# Patient Record
Sex: FEMALE | Race: WHITE | Employment: OTHER | URBAN - METROPOLITAN AREA
[De-identification: names, ages, dates, MRNs, and addresses within clinical notes are randomized per-mention and may not be internally consistent; named-entity substitution may affect disease eponyms.]

---

## 2017-01-03 ENCOUNTER — LAB ENCOUNTER (OUTPATIENT)
Dept: LAB | Facility: HOSPITAL | Age: 82
End: 2017-01-03
Attending: OBSTETRICS & GYNECOLOGY

## 2017-01-03 ENCOUNTER — TELEPHONE (OUTPATIENT)
Dept: OBGYN CLINIC | Facility: CLINIC | Age: 82
End: 2017-01-03

## 2017-01-03 DIAGNOSIS — R35.0 FREQUENT URINATION: Primary | ICD-10-CM

## 2017-01-03 DIAGNOSIS — R35.0 FREQUENT URINATION: ICD-10-CM

## 2017-01-03 LAB
BILIRUB UR QL: NEGATIVE
COLOR UR: YELLOW
GLUCOSE UR-MCNC: NEGATIVE MG/DL
HGB UR QL STRIP.AUTO: NEGATIVE
KETONES UR-MCNC: NEGATIVE MG/DL
NITRITE UR QL STRIP.AUTO: POSITIVE
PH UR: 5 [PH] (ref 5–8)
PROT UR-MCNC: 30 MG/DL
RBC #/AREA URNS AUTO: 12 /HPF
SP GR UR STRIP: 1.02 (ref 1–1.03)
UROBILINOGEN UR STRIP-ACNC: <2
VIT C UR-MCNC: 40 MG/DL
WBC #/AREA URNS AUTO: 1059 /HPF

## 2017-01-03 PROCEDURE — 81001 URINALYSIS AUTO W/SCOPE: CPT

## 2017-01-03 PROCEDURE — 87186 SC STD MICRODIL/AGAR DIL: CPT

## 2017-01-03 PROCEDURE — 87088 URINE BACTERIA CULTURE: CPT

## 2017-01-03 PROCEDURE — 87086 URINE CULTURE/COLONY COUNT: CPT

## 2017-01-03 NOTE — TELEPHONE ENCOUNTER
Pt is 80years old and has dementia. Per son Rik Marin, pt seems to be getting worse again. Pt was treated for a UTI in November and was \"much better after taking the medication. \" Rik Marin states pt is wetting her pants and running to the bathroom thinking she ha

## 2017-01-04 ENCOUNTER — TELEPHONE (OUTPATIENT)
Dept: OBGYN CLINIC | Facility: CLINIC | Age: 82
End: 2017-01-04

## 2017-01-04 RX ORDER — SULFAMETHOXAZOLE AND TRIMETHOPRIM 800; 160 MG/1; MG/1
1 TABLET ORAL 2 TIMES DAILY
Qty: 6 TABLET | Refills: 0 | Status: SHIPPED | OUTPATIENT
Start: 2017-01-04 | End: 2017-01-07

## 2017-01-04 NOTE — TELEPHONE ENCOUNTER
----- Message from Margarito Arrington MD sent at 1/3/2017 10:22 PM CST -----  Abnormal u/a.   Bactrim DS bid x 3 days

## 2017-01-04 NOTE — TELEPHONE ENCOUNTER
Spoke to pt's son Gabriel Diaz, explained results and recs. Gabriel Diaz verbalized understanding and will call if pt's symptoms don't resolve after the treatment.

## 2017-02-03 ENCOUNTER — TELEPHONE (OUTPATIENT)
Dept: OBGYN CLINIC | Facility: CLINIC | Age: 82
End: 2017-02-03

## 2017-02-03 NOTE — TELEPHONE ENCOUNTER
Spoke to Liliana Barrientos who asked us to please call back in 10 minutes as he cannot talk right now.

## 2017-02-03 NOTE — TELEPHONE ENCOUNTER
Spoke to Christian, rescheduled pt for Wednesday, 2/8, as pt goes to  tuesdays. José Antonio Barroso is very upset, states we have been cancelling so many appts lately and he is considering switching practices.  Apologized to Christian, informed him that sometimes when appts

## 2017-02-03 NOTE — TELEPHONE ENCOUNTER
Son is furious yelling and very mad because Bruce Grossman is not in the office today, and demanding a appt for him mom TODAY, per son this the 3rd time the office cancels her appts

## 2017-02-08 ENCOUNTER — OFFICE VISIT (OUTPATIENT)
Dept: OBGYN CLINIC | Facility: CLINIC | Age: 82
End: 2017-02-08

## 2017-02-08 VITALS — DIASTOLIC BLOOD PRESSURE: 76 MMHG | HEART RATE: 67 BPM | SYSTOLIC BLOOD PRESSURE: 129 MMHG

## 2017-02-08 DIAGNOSIS — N81.11 MIDLINE CYSTOCELE: Primary | ICD-10-CM

## 2017-02-08 PROCEDURE — 99212 OFFICE O/P EST SF 10 MIN: CPT | Performed by: OBSTETRICS & GYNECOLOGY

## 2017-02-09 NOTE — PROGRESS NOTES
Pessary Check     Slava Tobias [id number]  here for pessary check. Last seen *10/25/16. Doing well with pessary. Here with son. Pt with worsening dementia. No longer wetting bed after taking antibiotics for UTI.     Pessary type:  Ring with suppor

## 2017-02-23 ENCOUNTER — TELEPHONE (OUTPATIENT)
Dept: OBGYN CLINIC | Facility: CLINIC | Age: 82
End: 2017-02-23

## 2017-02-23 DIAGNOSIS — N39.498 OTHER URINARY INCONTINENCE: Primary | ICD-10-CM

## 2017-02-23 NOTE — TELEPHONE ENCOUNTER
Ceasar Dunn states pt has UTI. Pt has wet her pants twice at  and sometimes in the night last night. Pt was c/o pain at day care, but has not complained to Ceasar Dunn. Pt has dementia so often complains of things in the am and then denies pain later when asked.

## 2017-02-24 ENCOUNTER — APPOINTMENT (OUTPATIENT)
Dept: LAB | Facility: HOSPITAL | Age: 82
End: 2017-02-24
Attending: OBSTETRICS & GYNECOLOGY
Payer: MEDICARE

## 2017-02-24 DIAGNOSIS — N39.498 OTHER URINARY INCONTINENCE: ICD-10-CM

## 2017-02-24 PROCEDURE — 87186 SC STD MICRODIL/AGAR DIL: CPT

## 2017-02-24 PROCEDURE — 87086 URINE CULTURE/COLONY COUNT: CPT

## 2017-02-24 PROCEDURE — 87088 URINE BACTERIA CULTURE: CPT

## 2017-02-24 RX ORDER — SULFAMETHOXAZOLE AND TRIMETHOPRIM 800; 160 MG/1; MG/1
TABLET ORAL
Qty: 6 TABLET | Refills: 0 | Status: SHIPPED | OUTPATIENT
Start: 2017-02-24 | End: 2017-05-11

## 2017-02-24 NOTE — TELEPHONE ENCOUNTER
Pt still needs urine culture. Once she does lab, she can have Bactrim DS bid x 3 days while we wait for results.   If she continues to have UTI, needs to be addressed with PCP

## 2017-02-24 NOTE — TELEPHONE ENCOUNTER
Informed Dilcia Manley that pt needs urine culture. Informed him once she does lab, she can have Bactrim DS bid x 3 days while we wait for results. Informed him if she continues to have UTI, needs to be addressed with PCP.   In

## 2017-02-25 ENCOUNTER — TELEPHONE (OUTPATIENT)
Dept: OBGYN CLINIC | Facility: CLINIC | Age: 82
End: 2017-02-25

## 2017-02-25 NOTE — TELEPHONE ENCOUNTER
LEFT MESSAGE AT THE PHONE NUMBER FOR [facility] THAT THE CULTURE IS STILL PRELIMINARY. WILL LIKELY HAVE RESULT Monday SO CAN CALL BACK THEN FOR THE RESULT.

## 2017-02-27 NOTE — TELEPHONE ENCOUNTER
Informed pt's son that Valeria Simon [id number] stated urine culture - ecoli sensitive to Bactrim and if he suspects another UTI, needs to see pcp. Rox Iyer the son states that he was informed to wait for the urine culture and then start the medication.   Started explain that I

## 2017-02-27 NOTE — TELEPHONE ENCOUNTER
Informed pt's son Levon Given that urine CX showed E coli, but JLK will need to review to see if pt should continue with Bactrim or if she will change medication. Per Scott Walters, ok to Lm on VM if he does not answer.  Please review urine cx

## 2017-03-24 ENCOUNTER — HOSPITAL ENCOUNTER (EMERGENCY)
Facility: HOSPITAL | Age: 82
Discharge: HOME | End: 2017-03-24
Attending: EMERGENCY MEDICINE
Payer: MEDICARE

## 2017-03-24 ENCOUNTER — APPOINTMENT (OUTPATIENT)
Dept: GENERAL RADIOLOGY | Facility: HOSPITAL | Age: 82
End: 2017-03-24
Attending: EMERGENCY MEDICINE
Payer: MEDICARE

## 2017-03-24 VITALS
OXYGEN SATURATION: 98 % | TEMPERATURE: 98 F | DIASTOLIC BLOOD PRESSURE: 68 MMHG | HEART RATE: 68 BPM | RESPIRATION RATE: 16 BRPM | SYSTOLIC BLOOD PRESSURE: 104 MMHG

## 2017-03-24 DIAGNOSIS — R11.2 NAUSEA AND VOMITING IN ADULT: Primary | ICD-10-CM

## 2017-03-24 DIAGNOSIS — E86.0 DEHYDRATION: ICD-10-CM

## 2017-03-24 LAB
ALBUMIN SERPL BCP-MCNC: 3.7 G/DL (ref 3.5–4.8)
ALBUMIN/GLOB SERPL: 1.2 {RATIO} (ref 1–2)
ALP SERPL-CCNC: 57 U/L (ref 32–100)
ALT SERPL-CCNC: 12 U/L (ref 14–54)
ANION GAP SERPL CALC-SCNC: 8 MMOL/L (ref 0–18)
AST SERPL-CCNC: 22 U/L (ref 15–41)
BACTERIA UR QL AUTO: NEGATIVE /HPF
BILIRUB SERPL-MCNC: 0.6 MG/DL (ref 0.3–1.2)
BILIRUB UR QL: NEGATIVE
BUN SERPL-MCNC: 25 MG/DL (ref 8–20)
BUN/CREAT SERPL: 17.9 (ref 10–20)
CALCIUM SERPL-MCNC: 9.1 MG/DL (ref 8.5–10.5)
CHLORIDE SERPL-SCNC: 102 MMOL/L (ref 95–110)
CLARITY UR: CLEAR
CO2 SERPL-SCNC: 26 MMOL/L (ref 22–32)
COLOR UR: YELLOW
CREAT SERPL-MCNC: 1.4 MG/DL (ref 0.5–1.5)
GLOBULIN PLAS-MCNC: 3 G/DL (ref 2.5–3.7)
GLUCOSE SERPL-MCNC: 107 MG/DL (ref 70–99)
GLUCOSE UR-MCNC: NEGATIVE MG/DL
HGB UR QL STRIP.AUTO: NEGATIVE
KETONES UR-MCNC: NEGATIVE MG/DL
LEUKOCYTE ESTERASE UR QL STRIP.AUTO: NEGATIVE
MAGNESIUM SERPL-MCNC: 2 MG/DL (ref 1.8–2.5)
NITRITE UR QL STRIP.AUTO: NEGATIVE
OSMOLALITY UR CALC.SUM OF ELEC: 287 MOSM/KG (ref 275–295)
PH UR: 6 [PH] (ref 5–8)
POTASSIUM SERPL-SCNC: 4.2 MMOL/L (ref 3.3–5.1)
PROT SERPL-MCNC: 6.7 G/DL (ref 5.9–8.4)
PROT UR-MCNC: NEGATIVE MG/DL
RBC #/AREA URNS AUTO: 1 /HPF
SODIUM SERPL-SCNC: 136 MMOL/L (ref 136–144)
SP GR UR STRIP: 1.02 (ref 1–1.03)
TROPONIN I SERPL-MCNC: 0.01 NG/ML (ref ?–0.03)
UROBILINOGEN UR STRIP-ACNC: <2
VIT C UR-MCNC: 40 MG/DL
WBC #/AREA URNS AUTO: <1 /HPF

## 2017-03-24 PROCEDURE — 85025 COMPLETE CBC W/AUTO DIFF WBC: CPT | Performed by: EMERGENCY MEDICINE

## 2017-03-24 PROCEDURE — 96360 HYDRATION IV INFUSION INIT: CPT

## 2017-03-24 PROCEDURE — 83735 ASSAY OF MAGNESIUM: CPT | Performed by: EMERGENCY MEDICINE

## 2017-03-24 PROCEDURE — 80053 COMPREHEN METABOLIC PANEL: CPT | Performed by: EMERGENCY MEDICINE

## 2017-03-24 PROCEDURE — 99285 EMERGENCY DEPT VISIT HI MDM: CPT

## 2017-03-24 PROCEDURE — 85060 BLOOD SMEAR INTERPRETATION: CPT | Performed by: EMERGENCY MEDICINE

## 2017-03-24 PROCEDURE — 93010 ELECTROCARDIOGRAM REPORT: CPT | Performed by: EMERGENCY MEDICINE

## 2017-03-24 PROCEDURE — 71010 XR CHEST AP PORTABLE  (CPT=71010): CPT

## 2017-03-24 PROCEDURE — 93005 ELECTROCARDIOGRAM TRACING: CPT

## 2017-03-24 PROCEDURE — 96361 HYDRATE IV INFUSION ADD-ON: CPT

## 2017-03-24 PROCEDURE — 84484 ASSAY OF TROPONIN QUANT: CPT | Performed by: EMERGENCY MEDICINE

## 2017-03-24 PROCEDURE — 81003 URINALYSIS AUTO W/O SCOPE: CPT | Performed by: EMERGENCY MEDICINE

## 2017-03-24 RX ORDER — ONDANSETRON 4 MG/1
4 TABLET, ORALLY DISINTEGRATING ORAL EVERY 8 HOURS PRN
Qty: 15 TABLET | Refills: 0 | Status: SHIPPED | OUTPATIENT
Start: 2017-03-24 | End: 2017-03-29

## 2017-03-24 RX ORDER — SODIUM CHLORIDE 9 MG/ML
INJECTION, SOLUTION INTRAVENOUS ONCE
Status: COMPLETED | OUTPATIENT
Start: 2017-03-24 | End: 2017-03-24

## 2017-03-24 NOTE — ED NOTES
Patient arrived and transported by [facility] after patient had just left the adult  facility and son whom is at the bedside noticed mom having difficulty in ambulating and increased weakness.  Upon arrival patient noted alert and oriented x1-2 nam

## 2017-03-24 NOTE — ED INITIAL ASSESSMENT (HPI)
Pt c/o generalized weakness, pt's skin feels warm to touch, pt c/o not having enough energy today while walking. Pt vomiting upon arrival, dark colored secretions. Pt denies any pain or sob.

## 2017-03-24 NOTE — ED PROVIDER NOTES
Patient Seen in: [facility] Emergency Department    History   No chief complaint on file.     Stated Complaint: weakness, vomiting     HPI    79 yo F with PMH HTN, HL, dementia presenting for evaluation of several hours of generalized weakness and na mouth.  Take one tablet by mouth daily   lisinopril (PRINIVIL,ZESTRIL) 10 MG Oral Tab,         Family History   Problem Relation Age of Onset   • Hypertension Mother    • Cancer Son      basal cell carcinoma   • Glaucoma Brother    • Diabetes Neg    • Macul GFR, -American 43 (*)     All other components within normal limits   URINALYSIS WITH CULTURE REFLEX - Abnormal; Notable for the following:     Ascorbic Acid Urine 40  (*)     All other components within normal limits   CBC W/ DIFFERENTIAL - Abn Dictated by (CST): Dinora Ratliff MD on 3/24/2017 at 17:53     Approved by (CST): Dinora Ratliff MD on 3/24/2017 at 17:53          3/24/2017  CONCLUSION: No acute cardiopulmonary abnormality.        MDM     DIFFERENTIAL DIAGNOSIS: After history and physical exa

## 2017-03-25 LAB
BASOPHILS # BLD: 0 K/UL (ref 0–0.2)
BASOPHILS NFR BLD: 0 %
EOSINOPHIL # BLD: 0 K/UL (ref 0–0.7)
EOSINOPHIL NFR BLD: 1 %
ERYTHROCYTE [DISTWIDTH] IN BLOOD BY AUTOMATED COUNT: 13.6 % (ref 11–15)
HCT VFR BLD AUTO: 29.1 % (ref 35–48)
HGB BLD-MCNC: 9.7 G/DL (ref 12–16)
LYMPHOCYTES # BLD: 0.5 K/UL (ref 1–4)
LYMPHOCYTES NFR BLD: 30 %
MCH RBC QN AUTO: 29.3 PG (ref 27–32)
MCHC RBC AUTO-ENTMCNC: 33.5 G/DL (ref 32–37)
MCV RBC AUTO: 87.4 FL (ref 80–100)
MONOCYTES # BLD: 0.3 K/UL (ref 0–1)
MONOCYTES NFR BLD: 18 %
NEUTROPHILS # BLD AUTO: 0.8 K/UL (ref 1.8–7.7)
NEUTROPHILS NFR BLD: 50 %
PLATELET # BLD AUTO: 115 K/UL (ref 140–400)
PMV BLD AUTO: 8 FL (ref 7.4–10.3)
RBC # BLD AUTO: 3.33 M/UL (ref 3.7–5.4)
WBC # BLD AUTO: 1.6 K/UL (ref 4–11)

## 2017-03-25 NOTE — ED NOTES
Nabila Quintana from lab called and stated abnormal labs which include wbc 1.6, platlets 116, hgb 9.7 per Nabila Quintana from lab will send blood for pathology. MD Julien Garrett aware.

## 2017-03-25 NOTE — ED NOTES
Patient tolerated PO challenge without any signs of vomiting or feeling nauseous. Both sons who are at the bedside asking to get mom dressed informed them to allow for the iv fluids to finish.

## 2017-04-27 PROBLEM — D61.818 PANCYTOPENIA (HCC): Status: ACTIVE | Noted: 2017-04-27

## 2017-04-27 PROBLEM — R63.4 WEIGHT LOSS: Status: ACTIVE | Noted: 2017-04-27

## 2017-04-27 PROBLEM — R13.14 PHARYNGOESOPHAGEAL DYSPHAGIA: Status: ACTIVE | Noted: 2017-04-27

## 2017-04-27 PROBLEM — I10 ESSENTIAL HYPERTENSION: Status: ACTIVE | Noted: 2017-04-27

## 2017-04-27 PROBLEM — F03.90 DEMENTIA WITHOUT BEHAVIORAL DISTURBANCE, UNSPECIFIED DEMENTIA TYPE (HCC): Status: ACTIVE | Noted: 2017-04-27

## 2017-05-04 ENCOUNTER — LAB ENCOUNTER (OUTPATIENT)
Dept: LAB | Facility: HOSPITAL | Age: 82
End: 2017-05-04
Attending: INTERNAL MEDICINE
Payer: MEDICARE

## 2017-05-04 DIAGNOSIS — D61.818 PANCYTOPENIA (HCC): ICD-10-CM

## 2017-05-04 DIAGNOSIS — I10 ESSENTIAL HYPERTENSION: ICD-10-CM

## 2017-05-04 DIAGNOSIS — R63.4 WEIGHT LOSS: ICD-10-CM

## 2017-05-04 PROCEDURE — 80069 RENAL FUNCTION PANEL: CPT

## 2017-05-04 PROCEDURE — 36415 COLL VENOUS BLD VENIPUNCTURE: CPT

## 2017-05-04 PROCEDURE — 85025 COMPLETE CBC W/AUTO DIFF WBC: CPT

## 2017-05-04 PROCEDURE — 80076 HEPATIC FUNCTION PANEL: CPT

## 2017-05-04 PROCEDURE — 84443 ASSAY THYROID STIM HORMONE: CPT

## 2017-05-11 ENCOUNTER — OFFICE VISIT (OUTPATIENT)
Dept: OBGYN CLINIC | Facility: CLINIC | Age: 82
End: 2017-05-11

## 2017-05-11 VITALS — SYSTOLIC BLOOD PRESSURE: 119 MMHG | HEART RATE: 78 BPM | DIASTOLIC BLOOD PRESSURE: 69 MMHG

## 2017-05-11 DIAGNOSIS — N81.11 MIDLINE CYSTOCELE: Primary | ICD-10-CM

## 2017-05-11 PROCEDURE — 99212 OFFICE O/P EST SF 10 MIN: CPT | Performed by: OBSTETRICS & GYNECOLOGY

## 2017-05-11 NOTE — PROGRESS NOTES
Pessary Check     Robert Madrigal [id number]  here for pessary check. Last seen 2/8/17. Doing well with pessary. Has good support. Here with son. Pt with worsening dementia. Had UTI in 2/2017.   No new c/o    Pessary type:  Ring with support pess

## 2017-05-22 PROCEDURE — 87086 URINE CULTURE/COLONY COUNT: CPT | Performed by: NURSE PRACTITIONER

## 2017-05-22 PROCEDURE — 87077 CULTURE AEROBIC IDENTIFY: CPT | Performed by: NURSE PRACTITIONER

## 2017-05-22 PROCEDURE — 87186 SC STD MICRODIL/AGAR DIL: CPT | Performed by: NURSE PRACTITIONER

## 2017-06-12 PROCEDURE — 87086 URINE CULTURE/COLONY COUNT: CPT | Performed by: INTERNAL MEDICINE

## 2017-07-12 ENCOUNTER — APPOINTMENT (OUTPATIENT)
Dept: LAB | Facility: HOSPITAL | Age: 82
End: 2017-07-12
Attending: NURSE PRACTITIONER
Payer: MEDICARE

## 2017-07-12 DIAGNOSIS — R30.0 DYSURIA: ICD-10-CM

## 2017-07-12 PROCEDURE — 87186 SC STD MICRODIL/AGAR DIL: CPT

## 2017-07-12 PROCEDURE — 87086 URINE CULTURE/COLONY COUNT: CPT

## 2017-07-12 PROCEDURE — 87088 URINE BACTERIA CULTURE: CPT

## 2017-07-21 ENCOUNTER — APPOINTMENT (OUTPATIENT)
Dept: LAB | Facility: HOSPITAL | Age: 82
End: 2017-07-21
Attending: NURSE PRACTITIONER
Payer: MEDICARE

## 2017-07-21 DIAGNOSIS — N30.00 ACUTE CYSTITIS WITHOUT HEMATURIA: ICD-10-CM

## 2017-07-21 PROCEDURE — 87086 URINE CULTURE/COLONY COUNT: CPT

## 2017-08-14 ENCOUNTER — APPOINTMENT (OUTPATIENT)
Dept: LAB | Facility: HOSPITAL | Age: 82
End: 2017-08-14
Attending: INTERNAL MEDICINE
Payer: MEDICARE

## 2017-08-14 PROCEDURE — 87086 URINE CULTURE/COLONY COUNT: CPT | Performed by: INTERNAL MEDICINE

## 2017-08-14 PROCEDURE — 87186 SC STD MICRODIL/AGAR DIL: CPT | Performed by: INTERNAL MEDICINE

## 2017-08-14 PROCEDURE — 81001 URINALYSIS AUTO W/SCOPE: CPT | Performed by: INTERNAL MEDICINE

## 2017-08-14 PROCEDURE — 87088 URINE BACTERIA CULTURE: CPT | Performed by: INTERNAL MEDICINE

## 2017-09-14 ENCOUNTER — LAB ENCOUNTER (OUTPATIENT)
Dept: LAB | Facility: HOSPITAL | Age: 82
End: 2017-09-14
Attending: NURSE PRACTITIONER
Payer: MEDICARE

## 2017-09-14 DIAGNOSIS — N30.00 ACUTE CYSTITIS WITHOUT HEMATURIA: ICD-10-CM

## 2017-09-14 LAB
ALBUMIN SERPL BCP-MCNC: 3.8 G/DL (ref 3.5–4.8)
ALBUMIN/GLOB SERPL: 1.1 {RATIO} (ref 1–2)
ALP SERPL-CCNC: 61 U/L (ref 32–100)
ALT SERPL-CCNC: 12 U/L (ref 14–54)
ANION GAP SERPL CALC-SCNC: 9 MMOL/L (ref 0–18)
AST SERPL-CCNC: 17 U/L (ref 15–41)
BASOPHILS # BLD: 0 K/UL (ref 0–0.2)
BASOPHILS NFR BLD: 0 %
BILIRUB SERPL-MCNC: 0.3 MG/DL (ref 0.3–1.2)
BUN SERPL-MCNC: 17 MG/DL (ref 8–20)
BUN/CREAT SERPL: 15.5 (ref 10–20)
CALCIUM SERPL-MCNC: 9.5 MG/DL (ref 8.5–10.5)
CHLORIDE SERPL-SCNC: 102 MMOL/L (ref 95–110)
CO2 SERPL-SCNC: 30 MMOL/L (ref 22–32)
CREAT SERPL-MCNC: 1.1 MG/DL (ref 0.5–1.5)
EOSINOPHIL # BLD: 0 K/UL (ref 0–0.7)
EOSINOPHIL NFR BLD: 1 %
ERYTHROCYTE [DISTWIDTH] IN BLOOD BY AUTOMATED COUNT: 15 % (ref 11–15)
GLOBULIN PLAS-MCNC: 3.4 G/DL (ref 2.5–3.7)
GLUCOSE SERPL-MCNC: 95 MG/DL (ref 70–99)
HCT VFR BLD AUTO: 31.7 % (ref 35–48)
HGB BLD-MCNC: 10.4 G/DL (ref 12–16)
LYMPHOCYTES # BLD: 0.9 K/UL (ref 1–4)
LYMPHOCYTES NFR BLD: 39 %
MCH RBC QN AUTO: 28 PG (ref 27–32)
MCHC RBC AUTO-ENTMCNC: 32.9 G/DL (ref 32–37)
MCV RBC AUTO: 85.2 FL (ref 80–100)
MONOCYTES # BLD: 0.3 K/UL (ref 0–1)
MONOCYTES NFR BLD: 11 %
NEUTROPHILS # BLD AUTO: 1.2 K/UL (ref 1.8–7.7)
NEUTROPHILS NFR BLD: 49 %
OSMOLALITY UR CALC.SUM OF ELEC: 293 MOSM/KG (ref 275–295)
PLATELET # BLD AUTO: 143 K/UL (ref 140–400)
PMV BLD AUTO: 8.4 FL (ref 7.4–10.3)
POTASSIUM SERPL-SCNC: 4.8 MMOL/L (ref 3.3–5.1)
PROT SERPL-MCNC: 7.2 G/DL (ref 5.9–8.4)
RBC # BLD AUTO: 3.72 M/UL (ref 3.7–5.4)
SODIUM SERPL-SCNC: 141 MMOL/L (ref 136–144)
WBC # BLD AUTO: 2.4 K/UL (ref 4–11)

## 2017-09-14 PROCEDURE — 85025 COMPLETE CBC W/AUTO DIFF WBC: CPT

## 2017-09-14 PROCEDURE — 36415 COLL VENOUS BLD VENIPUNCTURE: CPT

## 2017-09-14 PROCEDURE — 80053 COMPREHEN METABOLIC PANEL: CPT

## 2017-09-21 PROCEDURE — 87086 URINE CULTURE/COLONY COUNT: CPT | Performed by: INTERNAL MEDICINE

## 2017-11-06 ENCOUNTER — HOSPITAL ENCOUNTER (EMERGENCY)
Facility: HOSPITAL | Age: 82
Discharge: HOME | End: 2017-11-06
Attending: EMERGENCY MEDICINE
Payer: MEDICARE

## 2017-11-06 VITALS
TEMPERATURE: 98 F | HEART RATE: 65 BPM | OXYGEN SATURATION: 99 % | SYSTOLIC BLOOD PRESSURE: 137 MMHG | RESPIRATION RATE: 15 BRPM | DIASTOLIC BLOOD PRESSURE: 55 MMHG

## 2017-11-06 DIAGNOSIS — D72.819 LEUKOPENIA, UNSPECIFIED TYPE: ICD-10-CM

## 2017-11-06 DIAGNOSIS — N39.0 URINARY TRACT INFECTION WITHOUT HEMATURIA, SITE UNSPECIFIED: Primary | ICD-10-CM

## 2017-11-06 DIAGNOSIS — R40.0 SOMNOLENCE: ICD-10-CM

## 2017-11-06 PROCEDURE — 87086 URINE CULTURE/COLONY COUNT: CPT | Performed by: EMERGENCY MEDICINE

## 2017-11-06 PROCEDURE — 85025 COMPLETE CBC W/AUTO DIFF WBC: CPT | Performed by: EMERGENCY MEDICINE

## 2017-11-06 PROCEDURE — 81001 URINALYSIS AUTO W/SCOPE: CPT | Performed by: EMERGENCY MEDICINE

## 2017-11-06 PROCEDURE — 80048 BASIC METABOLIC PNL TOTAL CA: CPT | Performed by: EMERGENCY MEDICINE

## 2017-11-06 PROCEDURE — 87186 SC STD MICRODIL/AGAR DIL: CPT | Performed by: EMERGENCY MEDICINE

## 2017-11-06 PROCEDURE — 96374 THER/PROPH/DIAG INJ IV PUSH: CPT

## 2017-11-06 PROCEDURE — 96361 HYDRATE IV INFUSION ADD-ON: CPT

## 2017-11-06 PROCEDURE — 87077 CULTURE AEROBIC IDENTIFY: CPT | Performed by: EMERGENCY MEDICINE

## 2017-11-06 PROCEDURE — 99284 EMERGENCY DEPT VISIT MOD MDM: CPT

## 2017-11-06 RX ORDER — CEFADROXIL 500 MG/1
500 CAPSULE ORAL 2 TIMES DAILY
Qty: 14 CAPSULE | Refills: 0 | Status: SHIPPED | OUTPATIENT
Start: 2017-11-06 | End: 2017-11-13

## 2017-11-06 NOTE — ED PROVIDER NOTES
Patient Seen in: [facility] AND [facility] Emergency Department    History   Patient presents with:  Altered Mental Status (neurologic)    Stated Complaint:     HPI    Patient presents to the emergency department with complaint of apparently the family noted liz (10 mg total) by mouth daily. Calcium 600 MG Oral Tab,  Take 600 mg by mouth. aspirin (ASPIRIN EC LOW DOSE) 81 MG Oral Tab EC,  Take  by mouth.  Take one tablet by mouth daily       Family History   Problem Relation Age of Onset   • Hypertension Mother perfusion. Respiratory:  Lungs clear to auscultation bilaterally with good effort. No wheezes, ronchi, or rales. Abdomen:  Soft, non-tender, non-distended. No masses, no hepato-splenomegaly. Negative McBurney's point tenderness.   Musculoskeletal:  Goo Bacteria Urine Many (*)     All other components within normal limits   CBC W/ DIFFERENTIAL - Abnormal; Notable for the following:     WBC 2.5 (*)     RBC 3.58 (*)     HGB 10.0 (*)     HCT 30.4 (*)     RDW 15.3 (*)     Neutrophil Absolute 1.0 (*)     All o

## 2017-11-06 NOTE — ED INITIAL ASSESSMENT (HPI)
C/o \"catatonic state\" since approximately 9p. Last night according to family, bcame entirely nonverbal at 11am today. Pt was sitting at the end of the bed upon EMS arrival to home and was ambulatory to the stretcher, following commands. .  Hx marlo

## 2018-01-01 ENCOUNTER — LAB REQUISITION (OUTPATIENT)
Dept: LAB | Facility: HOSPITAL | Age: 83
End: 2018-01-01
Payer: MEDICARE

## 2018-01-01 ENCOUNTER — APPOINTMENT (OUTPATIENT)
Dept: GENERAL RADIOLOGY | Facility: HOSPITAL | Age: 83
DRG: 570 | End: 2018-01-01
Attending: INTERNAL MEDICINE
Payer: MEDICARE

## 2018-01-01 ENCOUNTER — APPOINTMENT (OUTPATIENT)
Dept: GENERAL RADIOLOGY | Facility: HOSPITAL | Age: 83
DRG: 871 | End: 2018-01-01
Attending: EMERGENCY MEDICINE
Payer: MEDICARE

## 2018-01-01 ENCOUNTER — ANESTHESIA EVENT (OUTPATIENT)
Dept: SURGERY | Facility: HOSPITAL | Age: 83
DRG: 570 | End: 2018-01-01
Payer: MEDICARE

## 2018-01-01 ENCOUNTER — HOSPITAL ENCOUNTER (INPATIENT)
Facility: HOSPITAL | Age: 83
LOS: 5 days | Discharge: NURSING, REHAB OR LONG-TERM CARE | DRG: 871 | End: 2019-01-01
Attending: EMERGENCY MEDICINE | Admitting: INTERNAL MEDICINE
Payer: MEDICARE

## 2018-01-01 ENCOUNTER — ANESTHESIA (OUTPATIENT)
Dept: SURGERY | Facility: HOSPITAL | Age: 83
DRG: 570 | End: 2018-01-01
Payer: MEDICARE

## 2018-01-01 ENCOUNTER — HOSPITAL ENCOUNTER (INPATIENT)
Facility: HOSPITAL | Age: 83
LOS: 7 days | Discharge: NURSING, REHAB OR LONG-TERM CARE | DRG: 570 | End: 2018-01-01
Attending: EMERGENCY MEDICINE | Admitting: INTERNAL MEDICINE
Payer: MEDICARE

## 2018-01-01 ENCOUNTER — APPOINTMENT (OUTPATIENT)
Dept: PICC SERVICES | Facility: HOSPITAL | Age: 83
DRG: 570 | End: 2018-01-01
Attending: INTERNAL MEDICINE
Payer: MEDICARE

## 2018-01-01 ENCOUNTER — TELEPHONE (OUTPATIENT)
Dept: INTERNAL MEDICINE CLINIC | Facility: CLINIC | Age: 83
End: 2018-01-01

## 2018-01-01 VITALS
HEIGHT: 62 IN | TEMPERATURE: 98 F | OXYGEN SATURATION: 95 % | HEART RATE: 85 BPM | BODY MASS INDEX: 26.28 KG/M2 | DIASTOLIC BLOOD PRESSURE: 69 MMHG | RESPIRATION RATE: 18 BRPM | SYSTOLIC BLOOD PRESSURE: 142 MMHG | WEIGHT: 142.81 LBS

## 2018-01-01 DIAGNOSIS — L89.159 SACRAL DECUBITUS ULCER: ICD-10-CM

## 2018-01-01 DIAGNOSIS — I10 ESSENTIAL (PRIMARY) HYPERTENSION: ICD-10-CM

## 2018-01-01 DIAGNOSIS — R13.10 DYSPHAGIA: ICD-10-CM

## 2018-01-01 DIAGNOSIS — F03.91 DEMENTIA WITH BEHAVIORAL DISTURBANCE (HCC): ICD-10-CM

## 2018-01-01 DIAGNOSIS — E86.0 DEHYDRATION: ICD-10-CM

## 2018-01-01 DIAGNOSIS — L89.219 PRESSURE INJURY OF SKIN OF RIGHT HIP, UNSPECIFIED INJURY STAGE: Primary | ICD-10-CM

## 2018-01-01 DIAGNOSIS — Z74.1 NEED FOR ASSISTANCE WITH PERSONAL CARE: ICD-10-CM

## 2018-01-01 DIAGNOSIS — D64.9 ANEMIA, UNSPECIFIED TYPE: ICD-10-CM

## 2018-01-01 DIAGNOSIS — R30.0 DYSURIA: ICD-10-CM

## 2018-01-01 DIAGNOSIS — R50.9 FEVER, UNSPECIFIED FEVER CAUSE: ICD-10-CM

## 2018-01-01 DIAGNOSIS — J96.00 ACUTE RESPIRATORY FAILURE (HCC): ICD-10-CM

## 2018-01-01 DIAGNOSIS — M19.90 OSTEOARTHRITIS: ICD-10-CM

## 2018-01-01 DIAGNOSIS — A41.9 SEPSIS, DUE TO UNSPECIFIED ORGANISM: Primary | ICD-10-CM

## 2018-01-01 DIAGNOSIS — F03.90 DEMENTIA WITHOUT BEHAVIORAL DISTURBANCE (HCC): ICD-10-CM

## 2018-01-01 LAB
ADENOVIRUS PCR:: NEGATIVE
ALBUMIN SERPL BCP-MCNC: 2.2 G/DL (ref 3.5–4.8)
ALBUMIN SERPL BCP-MCNC: 2.3 G/DL (ref 3.5–4.8)
ALBUMIN SERPL BCP-MCNC: 2.7 G/DL (ref 3.5–4.8)
ALBUMIN/GLOB SERPL: 0.7 {RATIO} (ref 1–2)
ALBUMIN/GLOB SERPL: 0.9 {RATIO} (ref 1–2)
ALP SERPL-CCNC: 53 U/L (ref 32–100)
ALP SERPL-CCNC: 65 U/L (ref 32–100)
ALP SERPL-CCNC: 87 U/L (ref 32–100)
ALT SERPL-CCNC: 12 U/L (ref 14–54)
ALT SERPL-CCNC: 15 U/L (ref 14–54)
ALT SERPL-CCNC: 39 U/L (ref 14–54)
AMMONIA PLAS-MCNC: 20 UG/DL (ref 19.5–64.6)
AMMONIA PLAS-MCNC: 46 UG/DL (ref 19.5–64.6)
ANION GAP SERPL CALC-SCNC: 10 MMOL/L (ref 0–18)
ANION GAP SERPL CALC-SCNC: 4 MMOL/L (ref 0–18)
ANION GAP SERPL CALC-SCNC: 6 MMOL/L (ref 0–18)
ANION GAP SERPL CALC-SCNC: 7 MMOL/L (ref 0–18)
ANION GAP SERPL CALC-SCNC: 9 MMOL/L (ref 0–18)
ANTIBODY SCREEN: NEGATIVE
APTT PPP: 30.8 SECONDS (ref 23.2–35.3)
APTT PPP: 35.1 SECONDS (ref 23.2–35.3)
AST SERPL-CCNC: 20 U/L (ref 15–41)
AST SERPL-CCNC: 21 U/L (ref 15–41)
AST SERPL-CCNC: 34 U/L (ref 15–41)
B PERT DNA SPEC QL NAA+PROBE: NEGATIVE
BASOPHILS # BLD: 0 K/UL (ref 0–0.2)
BASOPHILS # BLD: 0.1 K/UL (ref 0–0.2)
BASOPHILS NFR BLD: 0 %
BASOPHILS NFR BLD: 1 %
BILIRUB DIRECT SERPL-MCNC: 0.2 MG/DL (ref 0–0.2)
BILIRUB SERPL-MCNC: 0.3 MG/DL (ref 0.3–1.2)
BILIRUB SERPL-MCNC: 0.4 MG/DL (ref 0.3–1.2)
BILIRUB SERPL-MCNC: 0.4 MG/DL (ref 0.3–1.2)
BILIRUB UR QL: NEGATIVE
BILIRUB UR QL: NEGATIVE
BLOOD TYPE BARCODE: 9500
BUN SERPL-MCNC: 13 MG/DL (ref 8–20)
BUN SERPL-MCNC: 13 MG/DL (ref 8–20)
BUN SERPL-MCNC: 17 MG/DL (ref 8–20)
BUN SERPL-MCNC: 19 MG/DL (ref 8–20)
BUN SERPL-MCNC: 21 MG/DL (ref 8–20)
BUN SERPL-MCNC: 23 MG/DL (ref 8–20)
BUN SERPL-MCNC: 25 MG/DL (ref 8–20)
BUN/CREAT SERPL: 13.5 (ref 10–20)
BUN/CREAT SERPL: 13.5 (ref 10–20)
BUN/CREAT SERPL: 15.1 (ref 10–20)
BUN/CREAT SERPL: 17.3 (ref 10–20)
BUN/CREAT SERPL: 20.6 (ref 10–20)
BUN/CREAT SERPL: 24 (ref 10–20)
BUN/CREAT SERPL: 27.5 (ref 10–20)
C PNEUM DNA SPEC QL NAA+PROBE: NEGATIVE
CALCIUM SERPL-MCNC: 8 MG/DL (ref 8.5–10.5)
CALCIUM SERPL-MCNC: 8.2 MG/DL (ref 8.5–10.5)
CALCIUM SERPL-MCNC: 8.3 MG/DL (ref 8.5–10.5)
CALCIUM SERPL-MCNC: 8.4 MG/DL (ref 8.5–10.5)
CALCIUM SERPL-MCNC: 8.6 MG/DL (ref 8.5–10.5)
CALCIUM SERPL-MCNC: 8.7 MG/DL (ref 8.5–10.5)
CALCIUM SERPL-MCNC: 8.8 MG/DL (ref 8.5–10.5)
CHLORIDE SERPL-SCNC: 101 MMOL/L (ref 95–110)
CHLORIDE SERPL-SCNC: 102 MMOL/L (ref 95–110)
CHLORIDE SERPL-SCNC: 103 MMOL/L (ref 95–110)
CHLORIDE SERPL-SCNC: 103 MMOL/L (ref 95–110)
CHLORIDE SERPL-SCNC: 104 MMOL/L (ref 95–110)
CHLORIDE SERPL-SCNC: 98 MMOL/L (ref 95–110)
CHLORIDE SERPL-SCNC: 99 MMOL/L (ref 95–110)
CLARITY UR: CLEAR
CO2 SERPL-SCNC: 24 MMOL/L (ref 22–32)
CO2 SERPL-SCNC: 25 MMOL/L (ref 22–32)
CO2 SERPL-SCNC: 25 MMOL/L (ref 22–32)
CO2 SERPL-SCNC: 28 MMOL/L (ref 22–32)
CO2 SERPL-SCNC: 30 MMOL/L (ref 22–32)
COLOR UR: YELLOW
COLOR UR: YELLOW
CORONAVIRUS 229E PCR:: NEGATIVE
CORONAVIRUS HKU1 PCR:: NEGATIVE
CORONAVIRUS NL63 PCR:: NEGATIVE
CORONAVIRUS OC43 PCR:: NEGATIVE
CREAT SERPL-MCNC: 0.91 MG/DL (ref 0.5–1.5)
CREAT SERPL-MCNC: 0.96 MG/DL (ref 0.5–1.5)
CREAT SERPL-MCNC: 0.98 MG/DL (ref 0.5–1.5)
CREAT SERPL-MCNC: 1.02 MG/DL (ref 0.5–1.5)
CREAT SERPL-MCNC: 1.26 MG/DL (ref 0.5–1.5)
EOSINOPHIL # BLD: 0 K/UL (ref 0–0.7)
EOSINOPHIL # BLD: 0.1 K/UL (ref 0–0.7)
EOSINOPHIL NFR BLD: 0 %
EOSINOPHIL NFR BLD: 1 %
EOSINOPHIL NFR BLD: 2 %
ERYTHROCYTE [DISTWIDTH] IN BLOOD BY AUTOMATED COUNT: 16.8 % (ref 11–15)
ERYTHROCYTE [DISTWIDTH] IN BLOOD BY AUTOMATED COUNT: 18 % (ref 11–15)
ERYTHROCYTE [DISTWIDTH] IN BLOOD BY AUTOMATED COUNT: 18.8 % (ref 11–15)
ERYTHROCYTE [DISTWIDTH] IN BLOOD BY AUTOMATED COUNT: 19 % (ref 11–15)
ERYTHROCYTE [DISTWIDTH] IN BLOOD BY AUTOMATED COUNT: 19 % (ref 11–15)
ERYTHROCYTE [DISTWIDTH] IN BLOOD BY AUTOMATED COUNT: 19.6 % (ref 11–15)
ERYTHROCYTE [DISTWIDTH] IN BLOOD BY AUTOMATED COUNT: 19.6 % (ref 11–15)
ERYTHROCYTE [DISTWIDTH] IN BLOOD BY AUTOMATED COUNT: 20 % (ref 11–15)
FERRITIN SERPL IA-MCNC: 280 NG/ML (ref 11–307)
FERRITIN SERPL IA-MCNC: 588 NG/ML (ref 11–307)
FLUAV RNA SPEC QL NAA+PROBE: NEGATIVE
FLUBV RNA SPEC QL NAA+PROBE: NEGATIVE
GLOBULIN PLAS-MCNC: 3 G/DL (ref 2.5–3.7)
GLOBULIN PLAS-MCNC: 3.2 G/DL (ref 2.5–3.7)
GLUCOSE SERPL-MCNC: 103 MG/DL (ref 70–99)
GLUCOSE SERPL-MCNC: 105 MG/DL (ref 70–99)
GLUCOSE SERPL-MCNC: 109 MG/DL (ref 70–99)
GLUCOSE SERPL-MCNC: 84 MG/DL (ref 70–99)
GLUCOSE SERPL-MCNC: 90 MG/DL (ref 70–99)
GLUCOSE SERPL-MCNC: 92 MG/DL (ref 70–99)
GLUCOSE SERPL-MCNC: 98 MG/DL (ref 70–99)
GLUCOSE UR-MCNC: NEGATIVE MG/DL
GLUCOSE UR-MCNC: NEGATIVE MG/DL
HCT VFR BLD AUTO: 22.3 % (ref 35–48)
HCT VFR BLD AUTO: 23 % (ref 35–48)
HCT VFR BLD AUTO: 23.2 % (ref 35–48)
HCT VFR BLD AUTO: 23.9 % (ref 35–48)
HCT VFR BLD AUTO: 24.8 % (ref 35–48)
HCT VFR BLD AUTO: 26.4 % (ref 35–48)
HCT VFR BLD AUTO: 27 % (ref 35–48)
HCT VFR BLD AUTO: 27.7 % (ref 35–48)
HEMOCCULT STL QL: POSITIVE
HGB BLD-MCNC: 7.1 G/DL (ref 12–16)
HGB BLD-MCNC: 7.5 G/DL (ref 12–16)
HGB BLD-MCNC: 7.6 G/DL (ref 12–16)
HGB BLD-MCNC: 7.8 G/DL (ref 12–16)
HGB BLD-MCNC: 8 G/DL (ref 12–16)
HGB BLD-MCNC: 8.6 G/DL (ref 12–16)
HGB BLD-MCNC: 8.8 G/DL (ref 12–16)
HGB BLD-MCNC: 9 G/DL (ref 12–16)
HGB BLD-MCNC: 9.8 G/DL (ref 12–16)
HGB UR QL STRIP.AUTO: NEGATIVE
INR BLD: 1.1 (ref 0.9–1.2)
INR BLD: 1.2 (ref 0.9–1.2)
IRON SATN MFR SERPL: 5 % (ref 15–50)
IRON SERPL-MCNC: 46 MCG/DL (ref 28–170)
IRON SERPL-MCNC: 8 MCG/DL (ref 28–170)
KETONES UR-MCNC: NEGATIVE MG/DL
KETONES UR-MCNC: NEGATIVE MG/DL
LACTATE SERPL-SCNC: 1.3 MMOL/L (ref 0.5–2.2)
LEUKOCYTE ESTERASE UR QL STRIP.AUTO: NEGATIVE
LIPASE SERPL-CCNC: 24 U/L (ref 22–51)
LYMPHOCYTES # BLD: 0.6 K/UL (ref 1–4)
LYMPHOCYTES # BLD: 1.1 K/UL (ref 1–4)
LYMPHOCYTES # BLD: 1.3 K/UL (ref 1–4)
LYMPHOCYTES # BLD: 1.6 K/UL (ref 1–4)
LYMPHOCYTES # BLD: 1.6 K/UL (ref 1–4)
LYMPHOCYTES # BLD: 1.7 K/UL (ref 1–4)
LYMPHOCYTES # BLD: 2 K/UL (ref 1–4)
LYMPHOCYTES # BLD: 2.4 K/UL (ref 1–4)
LYMPHOCYTES NFR BLD: 41 %
LYMPHOCYTES NFR BLD: 42 %
LYMPHOCYTES NFR BLD: 44 %
LYMPHOCYTES NFR BLD: 44 %
LYMPHOCYTES NFR BLD: 45 %
LYMPHOCYTES NFR BLD: 47 %
LYMPHOCYTES NFR BLD: 49 %
LYMPHOCYTES NFR BLD: 5 %
MAGNESIUM SERPL-MCNC: 2.1 MG/DL (ref 1.8–2.5)
MCH RBC QN AUTO: 26.4 PG (ref 27–32)
MCH RBC QN AUTO: 26.7 PG (ref 27–32)
MCH RBC QN AUTO: 26.8 PG (ref 27–32)
MCH RBC QN AUTO: 27.1 PG (ref 27–32)
MCH RBC QN AUTO: 27.2 PG (ref 27–32)
MCH RBC QN AUTO: 28 PG (ref 27–32)
MCHC RBC AUTO-ENTMCNC: 31.9 G/DL (ref 32–37)
MCHC RBC AUTO-ENTMCNC: 32.3 G/DL (ref 32–37)
MCHC RBC AUTO-ENTMCNC: 32.3 G/DL (ref 32–37)
MCHC RBC AUTO-ENTMCNC: 32.4 G/DL (ref 32–37)
MCHC RBC AUTO-ENTMCNC: 32.4 G/DL (ref 32–37)
MCHC RBC AUTO-ENTMCNC: 32.5 G/DL (ref 32–37)
MCHC RBC AUTO-ENTMCNC: 32.7 G/DL (ref 32–37)
MCHC RBC AUTO-ENTMCNC: 32.8 G/DL (ref 32–37)
MCV RBC AUTO: 82.4 FL (ref 80–100)
MCV RBC AUTO: 82.5 FL (ref 80–100)
MCV RBC AUTO: 82.5 FL (ref 80–100)
MCV RBC AUTO: 82.6 FL (ref 80–100)
MCV RBC AUTO: 82.9 FL (ref 80–100)
MCV RBC AUTO: 83.8 FL (ref 80–100)
MCV RBC AUTO: 83.9 FL (ref 80–100)
MCV RBC AUTO: 86.7 FL (ref 80–100)
METAMYELOCYTES # BLD MANUAL: 0.03 K/UL
METAMYELOCYTES NFR BLD: 1 %
METAPNEUMOVIRUS PCR:: NEGATIVE
MONOCYTES # BLD: 0.1 K/UL (ref 0–1)
MONOCYTES # BLD: 0.2 K/UL (ref 0–1)
MONOCYTES # BLD: 0.4 K/UL (ref 0–1)
MONOCYTES # BLD: 0.4 K/UL (ref 0–1)
MONOCYTES # BLD: 0.5 K/UL (ref 0–1)
MONOCYTES # BLD: 0.7 K/UL (ref 0–1)
MONOCYTES # BLD: 0.7 K/UL (ref 0–1)
MONOCYTES # BLD: 0.9 K/UL (ref 0–1)
MONOCYTES NFR BLD: 11 %
MONOCYTES NFR BLD: 13 %
MONOCYTES NFR BLD: 14 %
MONOCYTES NFR BLD: 15 %
MONOCYTES NFR BLD: 18 %
MONOCYTES NFR BLD: 2 %
MONOCYTES NFR BLD: 7 %
MONOCYTES NFR BLD: 7 %
MRSA DNA SPEC QL NAA+PROBE: NEGATIVE
MYCOPLASMA PNEUMONIA PCR:: NEGATIVE
NEUTROPHILS # BLD AUTO: 1.1 K/UL (ref 1.8–7.7)
NEUTROPHILS # BLD AUTO: 1.3 K/UL (ref 1.8–7.7)
NEUTROPHILS # BLD AUTO: 1.4 K/UL (ref 1.8–7.7)
NEUTROPHILS # BLD AUTO: 1.6 K/UL (ref 1.8–7.7)
NEUTROPHILS # BLD AUTO: 1.6 K/UL (ref 1.8–7.7)
NEUTROPHILS # BLD AUTO: 10.6 K/UL (ref 1.8–7.7)
NEUTROPHILS # BLD AUTO: 2.2 K/UL (ref 1.8–7.7)
NEUTROPHILS # BLD AUTO: 2.3 K/UL (ref 1.8–7.7)
NEUTROPHILS NFR BLD: 36 %
NEUTROPHILS NFR BLD: 38 %
NEUTROPHILS NFR BLD: 43 %
NEUTROPHILS NFR BLD: 44 %
NEUTROPHILS NFR BLD: 44 %
NEUTROPHILS NFR BLD: 47 %
NEUTROPHILS NFR BLD: 47 %
NEUTROPHILS NFR BLD: 88 %
NEUTS BAND NFR BLD: 2 %
NITRITE UR QL STRIP.AUTO: NEGATIVE
NITRITE UR QL STRIP.AUTO: NEGATIVE
OSMOLALITY UR CALC.SUM OF ELEC: 280 MOSM/KG (ref 275–295)
OSMOLALITY UR CALC.SUM OF ELEC: 282 MOSM/KG (ref 275–295)
OSMOLALITY UR CALC.SUM OF ELEC: 283 MOSM/KG (ref 275–295)
OSMOLALITY UR CALC.SUM OF ELEC: 288 MOSM/KG (ref 275–295)
OSMOLALITY UR CALC.SUM OF ELEC: 288 MOSM/KG (ref 275–295)
PARAINFLUENZA 1 PCR:: NEGATIVE
PARAINFLUENZA 2 PCR:: NEGATIVE
PARAINFLUENZA 3 PCR:: NEGATIVE
PARAINFLUENZA 4 PCR:: NEGATIVE
PH UR: 7 [PH] (ref 5–8)
PH UR: 8 [PH] (ref 5–8)
PHOSPHATE SERPL-MCNC: 3.5 MG/DL (ref 2.4–4.7)
PLATELET # BLD AUTO: 157 K/UL (ref 140–400)
PLATELET # BLD AUTO: 191 K/UL (ref 140–400)
PLATELET # BLD AUTO: 202 K/UL (ref 140–400)
PLATELET # BLD AUTO: 208 K/UL (ref 140–400)
PLATELET # BLD AUTO: 216 K/UL (ref 140–400)
PLATELET # BLD AUTO: 232 K/UL (ref 140–400)
PLATELET # BLD AUTO: 233 K/UL (ref 140–400)
PLATELET # BLD AUTO: 236 K/UL (ref 140–400)
PMV BLD AUTO: 7.1 FL (ref 7.4–10.3)
PMV BLD AUTO: 7.3 FL (ref 7.4–10.3)
PMV BLD AUTO: 7.3 FL (ref 7.4–10.3)
PMV BLD AUTO: 7.4 FL (ref 7.4–10.3)
PMV BLD AUTO: 7.5 FL (ref 7.4–10.3)
PMV BLD AUTO: 7.7 FL (ref 7.4–10.3)
PMV BLD AUTO: 8.1 FL (ref 7.4–10.3)
PMV BLD AUTO: 8.3 FL (ref 7.4–10.3)
POTASSIUM SERPL-SCNC: 4 MMOL/L (ref 3.3–5.1)
POTASSIUM SERPL-SCNC: 4.4 MMOL/L (ref 3.3–5.1)
POTASSIUM SERPL-SCNC: 4.4 MMOL/L (ref 3.3–5.1)
POTASSIUM SERPL-SCNC: 4.5 MMOL/L (ref 3.3–5.1)
POTASSIUM SERPL-SCNC: 4.5 MMOL/L (ref 3.3–5.1)
POTASSIUM SERPL-SCNC: 4.6 MMOL/L (ref 3.3–5.1)
POTASSIUM SERPL-SCNC: 4.8 MMOL/L (ref 3.3–5.1)
PROCALCITONIN SERPL-MCNC: 8.28 NG/ML (ref ?–0.11)
PROT SERPL-MCNC: 5.5 G/DL (ref 5.9–8.4)
PROT SERPL-MCNC: 5.7 G/DL (ref 5.9–8.4)
PROT SERPL-MCNC: 5.8 G/DL (ref 5.9–8.4)
PROT UR-MCNC: 100 MG/DL
PROT UR-MCNC: >=500 MG/DL
PROTHROMBIN TIME: 14.3 SECONDS (ref 11.8–14.5)
PROTHROMBIN TIME: 15 SECONDS (ref 11.8–14.5)
RBC # BLD AUTO: 2.7 M/UL (ref 3.7–5.4)
RBC # BLD AUTO: 2.79 M/UL (ref 3.7–5.4)
RBC # BLD AUTO: 2.81 M/UL (ref 3.7–5.4)
RBC # BLD AUTO: 2.86 M/UL (ref 3.7–5.4)
RBC # BLD AUTO: 2.88 M/UL (ref 3.7–5.4)
RBC # BLD AUTO: 3.19 M/UL (ref 3.7–5.4)
RBC # BLD AUTO: 3.22 M/UL (ref 3.7–5.4)
RBC # BLD AUTO: 3.3 M/UL (ref 3.7–5.4)
RBC #/AREA URNS AUTO: 23 /HPF
RBC #/AREA URNS AUTO: <1 /HPF
RH BLOOD TYPE: NEGATIVE
RHINOVIRUS/ENTERO PCR:: NEGATIVE
RSV RNA SPEC QL NAA+PROBE: NEGATIVE
SODIUM SERPL-SCNC: 134 MMOL/L (ref 136–144)
SODIUM SERPL-SCNC: 134 MMOL/L (ref 136–144)
SODIUM SERPL-SCNC: 135 MMOL/L (ref 136–144)
SODIUM SERPL-SCNC: 135 MMOL/L (ref 136–144)
SODIUM SERPL-SCNC: 136 MMOL/L (ref 136–144)
SODIUM SERPL-SCNC: 137 MMOL/L (ref 136–144)
SODIUM SERPL-SCNC: 137 MMOL/L (ref 136–144)
SP GR UR STRIP: 1.01 (ref 1–1.03)
SP GR UR STRIP: 1.01 (ref 1–1.03)
T3 SERPL-MCNC: 0.76 NG/ML (ref 0.87–1.78)
T4 FREE SERPL-MCNC: 0.87 NG/DL (ref 0.58–1.64)
TIBC SERPL-MCNC: 164 MCG/DL (ref 228–428)
TRANSFERRIN SERPL-MCNC: 124 MG/DL (ref 192–382)
TSH SERPL-ACNC: 0.28 UIU/ML (ref 0.45–5.33)
TSH SERPL-ACNC: 1.46 UIU/ML (ref 0.45–5.33)
UROBILINOGEN UR STRIP-ACNC: <2
UROBILINOGEN UR STRIP-ACNC: <2
VIT B12 SERPL-MCNC: 623 PG/ML (ref 181–914)
VIT B12 SERPL-MCNC: 755 PG/ML (ref 181–914)
VIT C UR-MCNC: NEGATIVE MG/DL
WBC # BLD AUTO: 12.1 K/UL (ref 4–11)
WBC # BLD AUTO: 2.6 K/UL (ref 4–11)
WBC # BLD AUTO: 2.9 K/UL (ref 4–11)
WBC # BLD AUTO: 3.6 K/UL (ref 4–11)
WBC # BLD AUTO: 3.7 K/UL (ref 4–11)
WBC # BLD AUTO: 3.8 K/UL (ref 4–11)
WBC # BLD AUTO: 4.9 K/UL (ref 4–11)
WBC # BLD AUTO: 4.9 K/UL (ref 4–11)
WBC #/AREA URNS AUTO: 2 /HPF
WBC #/AREA URNS AUTO: 485 /HPF

## 2018-01-01 PROCEDURE — 82140 ASSAY OF AMMONIA: CPT | Performed by: OTHER

## 2018-01-01 PROCEDURE — 73502 X-RAY EXAM HIP UNI 2-3 VIEWS: CPT | Performed by: INTERNAL MEDICINE

## 2018-01-01 PROCEDURE — 80048 BASIC METABOLIC PNL TOTAL CA: CPT | Performed by: INTERNAL MEDICINE

## 2018-01-01 PROCEDURE — 99233 SBSQ HOSP IP/OBS HIGH 50: CPT | Performed by: INTERNAL MEDICINE

## 2018-01-01 PROCEDURE — 81001 URINALYSIS AUTO W/SCOPE: CPT

## 2018-01-01 PROCEDURE — 85025 COMPLETE CBC W/AUTO DIFF WBC: CPT | Performed by: INTERNAL MEDICINE

## 2018-01-01 PROCEDURE — 02HV33Z INSERTION OF INFUSION DEVICE INTO SUPERIOR VENA CAVA, PERCUTANEOUS APPROACH: ICD-10-PCS | Performed by: INTERNAL MEDICINE

## 2018-01-01 PROCEDURE — 87077 CULTURE AEROBIC IDENTIFY: CPT

## 2018-01-01 PROCEDURE — 80053 COMPREHEN METABOLIC PANEL: CPT | Performed by: INTERNAL MEDICINE

## 2018-01-01 PROCEDURE — 99223 1ST HOSP IP/OBS HIGH 75: CPT | Performed by: INTERNAL MEDICINE

## 2018-01-01 PROCEDURE — 87086 URINE CULTURE/COLONY COUNT: CPT

## 2018-01-01 PROCEDURE — 82140 ASSAY OF AMMONIA: CPT | Performed by: INTERNAL MEDICINE

## 2018-01-01 PROCEDURE — 30233N1 TRANSFUSION OF NONAUTOLOGOUS RED BLOOD CELLS INTO PERIPHERAL VEIN, PERCUTANEOUS APPROACH: ICD-10-PCS | Performed by: INTERNAL MEDICINE

## 2018-01-01 PROCEDURE — 87186 SC STD MICRODIL/AGAR DIL: CPT

## 2018-01-01 PROCEDURE — 0JBL0ZZ EXCISION OF RIGHT UPPER LEG SUBCUTANEOUS TISSUE AND FASCIA, OPEN APPROACH: ICD-10-PCS | Performed by: SURGERY

## 2018-01-01 PROCEDURE — 99232 SBSQ HOSP IP/OBS MODERATE 35: CPT | Performed by: INTERNAL MEDICINE

## 2018-01-01 PROCEDURE — 71045 X-RAY EXAM CHEST 1 VIEW: CPT | Performed by: EMERGENCY MEDICINE

## 2018-01-01 RX ORDER — LISINOPRIL 10 MG/1
10 TABLET ORAL DAILY
Status: DISCONTINUED | OUTPATIENT
Start: 2018-01-01 | End: 2018-01-01

## 2018-01-01 RX ORDER — ONDANSETRON 4 MG/1
4 TABLET, ORALLY DISINTEGRATING ORAL EVERY 6 HOURS PRN
Status: ON HOLD | COMMUNITY
End: 2019-01-01

## 2018-01-01 RX ORDER — SODIUM CHLORIDE, SODIUM LACTATE, POTASSIUM CHLORIDE, CALCIUM CHLORIDE 600; 310; 30; 20 MG/100ML; MG/100ML; MG/100ML; MG/100ML
INJECTION, SOLUTION INTRAVENOUS CONTINUOUS
Status: DISCONTINUED | OUTPATIENT
Start: 2018-01-01 | End: 2018-01-01 | Stop reason: HOSPADM

## 2018-01-01 RX ORDER — HEPARIN SODIUM 5000 [USP'U]/ML
5000 INJECTION, SOLUTION INTRAVENOUS; SUBCUTANEOUS EVERY 8 HOURS SCHEDULED
Status: DISCONTINUED | OUTPATIENT
Start: 2018-01-01 | End: 2018-01-01

## 2018-01-01 RX ORDER — SODIUM CHLORIDE 0.9 % (FLUSH) 0.9 %
10 SYRINGE (ML) INJECTION AS NEEDED
Status: DISCONTINUED | OUTPATIENT
Start: 2018-01-01 | End: 2018-01-01

## 2018-01-01 RX ORDER — DIVALPROEX SODIUM 125 MG/1
125 CAPSULE, COATED PELLETS ORAL NIGHTLY
Qty: 30 CAPSULE | Refills: 0 | Status: ON HOLD | OUTPATIENT
Start: 2018-01-01 | End: 2019-01-01

## 2018-01-01 RX ORDER — HYDROCODONE BITARTRATE AND ACETAMINOPHEN 5; 325 MG/1; MG/1
1 TABLET ORAL EVERY 6 HOURS PRN
Status: DISCONTINUED | OUTPATIENT
Start: 2018-01-01 | End: 2018-01-01

## 2018-01-01 RX ORDER — CEFAZOLIN SODIUM 1 G/3ML
INJECTION, POWDER, FOR SOLUTION INTRAMUSCULAR; INTRAVENOUS AS NEEDED
Status: DISCONTINUED | OUTPATIENT
Start: 2018-01-01 | End: 2018-01-01 | Stop reason: SURG

## 2018-01-01 RX ORDER — HEPARIN SODIUM 5000 [USP'U]/ML
5000 INJECTION, SOLUTION INTRAVENOUS; SUBCUTANEOUS EVERY 8 HOURS SCHEDULED
Qty: 1 VIAL | Refills: 0 | Status: ON HOLD | OUTPATIENT
Start: 2018-01-01 | End: 2019-01-01

## 2018-01-01 RX ORDER — LIDOCAINE HYDROCHLORIDE 10 MG/ML
INJECTION, SOLUTION EPIDURAL; INFILTRATION; INTRACAUDAL; PERINEURAL AS NEEDED
Status: DISCONTINUED | OUTPATIENT
Start: 2018-01-01 | End: 2018-01-01 | Stop reason: SURG

## 2018-01-01 RX ORDER — SODIUM CHLORIDE 9 MG/ML
INJECTION, SOLUTION INTRAVENOUS CONTINUOUS
Status: DISCONTINUED | OUTPATIENT
Start: 2018-01-01 | End: 2018-01-01

## 2018-01-01 RX ORDER — NALOXONE HYDROCHLORIDE 0.4 MG/ML
80 INJECTION, SOLUTION INTRAMUSCULAR; INTRAVENOUS; SUBCUTANEOUS AS NEEDED
Status: DISCONTINUED | OUTPATIENT
Start: 2018-01-01 | End: 2018-01-01 | Stop reason: HOSPADM

## 2018-01-01 RX ORDER — DEXTROSE AND SODIUM CHLORIDE 5; .9 G/100ML; G/100ML
INJECTION, SOLUTION INTRAVENOUS CONTINUOUS
Status: DISCONTINUED | OUTPATIENT
Start: 2018-01-01 | End: 2019-01-01

## 2018-01-01 RX ORDER — CALCIUM CARBONATE 500(1250)
500 TABLET ORAL DAILY
Status: DISCONTINUED | OUTPATIENT
Start: 2018-01-01 | End: 2018-01-01

## 2018-01-01 RX ORDER — MELATONIN
325
Status: DISCONTINUED | OUTPATIENT
Start: 2018-01-01 | End: 2018-01-01

## 2018-01-01 RX ORDER — ACETAMINOPHEN 325 MG/1
325 TABLET ORAL EVERY 6 HOURS PRN
Status: DISCONTINUED | OUTPATIENT
Start: 2018-01-01 | End: 2018-01-01

## 2018-01-01 RX ORDER — LIDOCAINE HYDROCHLORIDE 10 MG/ML
0.5 INJECTION, SOLUTION INFILTRATION; PERINEURAL ONCE AS NEEDED
Status: ACTIVE | OUTPATIENT
Start: 2018-01-01 | End: 2018-01-01

## 2018-01-01 RX ORDER — 0.9 % SODIUM CHLORIDE 0.9 %
VIAL (ML) INJECTION
Status: COMPLETED
Start: 2018-01-01 | End: 2018-01-01

## 2018-01-01 RX ORDER — ONDANSETRON 2 MG/ML
4 INJECTION INTRAMUSCULAR; INTRAVENOUS ONCE AS NEEDED
Status: DISCONTINUED | OUTPATIENT
Start: 2018-01-01 | End: 2018-01-01 | Stop reason: HOSPADM

## 2018-01-01 RX ORDER — HYDROCODONE BITARTRATE AND ACETAMINOPHEN 5; 325 MG/1; MG/1
2 TABLET ORAL AS NEEDED
Status: DISCONTINUED | OUTPATIENT
Start: 2018-01-01 | End: 2018-01-01 | Stop reason: HOSPADM

## 2018-01-01 RX ORDER — SODIUM CHLORIDE 9 MG/ML
INJECTION, SOLUTION INTRAVENOUS ONCE
Status: DISCONTINUED | OUTPATIENT
Start: 2018-01-01 | End: 2018-01-01

## 2018-01-01 RX ORDER — ACETAMINOPHEN 650 MG/1
650 SUPPOSITORY RECTAL EVERY 6 HOURS PRN
Status: DISCONTINUED | OUTPATIENT
Start: 2018-01-01 | End: 2019-01-01

## 2018-01-01 RX ORDER — MORPHINE SULFATE 4 MG/ML
2 INJECTION, SOLUTION INTRAMUSCULAR; INTRAVENOUS EVERY 10 MIN PRN
Status: DISCONTINUED | OUTPATIENT
Start: 2018-01-01 | End: 2018-01-01 | Stop reason: HOSPADM

## 2018-01-01 RX ORDER — SODIUM CHLORIDE 9 MG/ML
INJECTION, SOLUTION INTRAVENOUS
Status: COMPLETED
Start: 2018-01-01 | End: 2018-01-01

## 2018-01-01 RX ORDER — MORPHINE SULFATE 10 MG/ML
6 INJECTION, SOLUTION INTRAMUSCULAR; INTRAVENOUS EVERY 10 MIN PRN
Status: DISCONTINUED | OUTPATIENT
Start: 2018-01-01 | End: 2018-01-01 | Stop reason: HOSPADM

## 2018-01-01 RX ORDER — ONDANSETRON 4 MG/1
4 TABLET, ORALLY DISINTEGRATING ORAL EVERY 6 HOURS PRN
Status: DISCONTINUED | OUTPATIENT
Start: 2018-01-01 | End: 2018-01-01

## 2018-01-01 RX ORDER — HYDROCODONE BITARTRATE AND ACETAMINOPHEN 5; 325 MG/1; MG/1
1 TABLET ORAL AS NEEDED
Status: DISCONTINUED | OUTPATIENT
Start: 2018-01-01 | End: 2018-01-01 | Stop reason: HOSPADM

## 2018-01-01 RX ORDER — DIVALPROEX SODIUM 125 MG/1
125 CAPSULE, COATED PELLETS ORAL NIGHTLY
Status: DISCONTINUED | OUTPATIENT
Start: 2018-01-01 | End: 2018-01-01

## 2018-01-01 RX ORDER — HYDROCODONE BITARTRATE AND ACETAMINOPHEN 5; 325 MG/1; MG/1
1 TABLET ORAL EVERY 6 HOURS PRN
Qty: 30 TABLET | Refills: 0 | Status: ON HOLD | OUTPATIENT
Start: 2018-01-01 | End: 2019-01-01

## 2018-01-01 RX ORDER — MORPHINE SULFATE 4 MG/ML
4 INJECTION, SOLUTION INTRAMUSCULAR; INTRAVENOUS EVERY 10 MIN PRN
Status: DISCONTINUED | OUTPATIENT
Start: 2018-01-01 | End: 2018-01-01 | Stop reason: HOSPADM

## 2018-01-01 RX ORDER — LIDOCAINE HYDROCHLORIDE 40 MG/ML
SOLUTION TOPICAL AS NEEDED
Status: DISCONTINUED | OUTPATIENT
Start: 2018-01-01 | End: 2018-01-01 | Stop reason: SURG

## 2018-01-01 RX ORDER — SODIUM CHLORIDE 9 MG/ML
INJECTION, SOLUTION INTRAVENOUS CONTINUOUS PRN
Status: DISCONTINUED | OUTPATIENT
Start: 2018-01-01 | End: 2018-01-01 | Stop reason: SURG

## 2018-01-01 RX ADMIN — LIDOCAINE HYDROCHLORIDE 5 MG: 10 INJECTION, SOLUTION EPIDURAL; INFILTRATION; INTRACAUDAL; PERINEURAL at 10:34:00

## 2018-01-01 RX ADMIN — SODIUM CHLORIDE: 9 INJECTION, SOLUTION INTRAVENOUS at 11:03:00

## 2018-01-01 RX ADMIN — SODIUM CHLORIDE: 9 INJECTION, SOLUTION INTRAVENOUS at 10:25:00

## 2018-01-01 RX ADMIN — CEFAZOLIN SODIUM 2 G: 1 INJECTION, POWDER, FOR SOLUTION INTRAMUSCULAR; INTRAVENOUS at 10:45:00

## 2018-01-01 RX ADMIN — LIDOCAINE HYDROCHLORIDE 2 ML: 40 SOLUTION TOPICAL at 10:34:00

## 2018-02-20 ENCOUNTER — APPOINTMENT (OUTPATIENT)
Dept: CT IMAGING | Facility: HOSPITAL | Age: 83
DRG: 690 | End: 2018-02-20
Attending: EMERGENCY MEDICINE
Payer: MEDICARE

## 2018-02-20 ENCOUNTER — HOSPITAL ENCOUNTER (EMERGENCY)
Facility: HOSPITAL | Age: 83
Discharge: HOME | DRG: 690 | End: 2018-02-20
Attending: EMERGENCY MEDICINE
Payer: MEDICARE

## 2018-02-20 ENCOUNTER — APPOINTMENT (OUTPATIENT)
Dept: GENERAL RADIOLOGY | Facility: HOSPITAL | Age: 83
DRG: 690 | End: 2018-02-20
Attending: EMERGENCY MEDICINE
Payer: MEDICARE

## 2018-02-20 ENCOUNTER — HOSPITAL ENCOUNTER (INPATIENT)
Facility: HOSPITAL | Age: 83
LOS: 4 days | Discharge: HOME HEALTH | DRG: 690 | End: 2018-02-24
Attending: EMERGENCY MEDICINE | Admitting: INTERNAL MEDICINE
Payer: MEDICARE

## 2018-02-20 VITALS
DIASTOLIC BLOOD PRESSURE: 58 MMHG | HEART RATE: 73 BPM | BODY MASS INDEX: 27.6 KG/M2 | SYSTOLIC BLOOD PRESSURE: 147 MMHG | WEIGHT: 150 LBS | OXYGEN SATURATION: 97 % | RESPIRATION RATE: 17 BRPM | HEIGHT: 62 IN | TEMPERATURE: 98 F

## 2018-02-20 DIAGNOSIS — R53.1 WEAKNESS GENERALIZED: ICD-10-CM

## 2018-02-20 DIAGNOSIS — N39.0 URINARY TRACT INFECTION WITHOUT HEMATURIA, SITE UNSPECIFIED: Primary | ICD-10-CM

## 2018-02-20 LAB
ANION GAP SERPL CALC-SCNC: 8 MMOL/L (ref 0–18)
BASOPHILS # BLD: 0 K/UL (ref 0–0.2)
BASOPHILS NFR BLD: 1 %
BILIRUB UR QL: NEGATIVE
BUN SERPL-MCNC: 27 MG/DL (ref 8–20)
BUN/CREAT SERPL: 22.3 (ref 10–20)
CALCIUM SERPL-MCNC: 8.9 MG/DL (ref 8.5–10.5)
CHLORIDE SERPL-SCNC: 100 MMOL/L (ref 95–110)
CO2 SERPL-SCNC: 28 MMOL/L (ref 22–32)
COLOR UR: YELLOW
CREAT SERPL-MCNC: 1.21 MG/DL (ref 0.5–1.5)
EOSINOPHIL # BLD: 0.1 K/UL (ref 0–0.7)
EOSINOPHIL NFR BLD: 2 %
ERYTHROCYTE [DISTWIDTH] IN BLOOD BY AUTOMATED COUNT: 14.6 % (ref 11–15)
GLUCOSE BLDC GLUCOMTR-MCNC: 99 MG/DL (ref 70–99)
GLUCOSE SERPL-MCNC: 87 MG/DL (ref 70–99)
GLUCOSE UR-MCNC: NEGATIVE MG/DL
HCT VFR BLD AUTO: 29.6 % (ref 35–48)
HGB BLD-MCNC: 9.9 G/DL (ref 12–16)
KETONES UR-MCNC: NEGATIVE MG/DL
LYMPHOCYTES # BLD: 1.3 K/UL (ref 1–4)
LYMPHOCYTES NFR BLD: 39 %
MCH RBC QN AUTO: 28.7 PG (ref 27–32)
MCHC RBC AUTO-ENTMCNC: 33.3 G/DL (ref 32–37)
MCV RBC AUTO: 86.3 FL (ref 80–100)
MONOCYTES # BLD: 0.3 K/UL (ref 0–1)
MONOCYTES NFR BLD: 11 %
NEUTROPHILS # BLD AUTO: 1.6 K/UL (ref 1.8–7.7)
NEUTROPHILS NFR BLD: 49 %
NITRITE UR QL STRIP.AUTO: NEGATIVE
OSMOLALITY UR CALC.SUM OF ELEC: 286 MOSM/KG (ref 275–295)
PH UR: 7 [PH] (ref 5–8)
PLATELET # BLD AUTO: 114 K/UL (ref 140–400)
PMV BLD AUTO: 8.4 FL (ref 7.4–10.3)
POTASSIUM SERPL-SCNC: 4.7 MMOL/L (ref 3.3–5.1)
PROT UR-MCNC: 100 MG/DL
RBC # BLD AUTO: 3.44 M/UL (ref 3.7–5.4)
RBC #/AREA URNS AUTO: 8 /HPF
SODIUM SERPL-SCNC: 136 MMOL/L (ref 136–144)
SP GR UR STRIP: 1.01 (ref 1–1.03)
TROPONIN I SERPL-MCNC: 0.01 NG/ML (ref ?–0.03)
UROBILINOGEN UR STRIP-ACNC: <2
VIT C UR-MCNC: NEGATIVE MG/DL
WBC # BLD AUTO: 3.3 K/UL (ref 4–11)
WBC #/AREA URNS AUTO: 2122 /HPF

## 2018-02-20 PROCEDURE — 85025 COMPLETE CBC W/AUTO DIFF WBC: CPT | Performed by: EMERGENCY MEDICINE

## 2018-02-20 PROCEDURE — 96360 HYDRATION IV INFUSION INIT: CPT

## 2018-02-20 PROCEDURE — 93010 ELECTROCARDIOGRAM REPORT: CPT | Performed by: EMERGENCY MEDICINE

## 2018-02-20 PROCEDURE — 81001 URINALYSIS AUTO W/SCOPE: CPT | Performed by: EMERGENCY MEDICINE

## 2018-02-20 PROCEDURE — 93005 ELECTROCARDIOGRAM TRACING: CPT

## 2018-02-20 PROCEDURE — A4216 STERILE WATER/SALINE, 10 ML: HCPCS | Performed by: INTERNAL MEDICINE

## 2018-02-20 PROCEDURE — 99285 EMERGENCY DEPT VISIT HI MDM: CPT

## 2018-02-20 PROCEDURE — 87086 URINE CULTURE/COLONY COUNT: CPT | Performed by: EMERGENCY MEDICINE

## 2018-02-20 PROCEDURE — 80048 BASIC METABOLIC PNL TOTAL CA: CPT | Performed by: EMERGENCY MEDICINE

## 2018-02-20 PROCEDURE — 71045 X-RAY EXAM CHEST 1 VIEW: CPT | Performed by: EMERGENCY MEDICINE

## 2018-02-20 PROCEDURE — 84484 ASSAY OF TROPONIN QUANT: CPT | Performed by: EMERGENCY MEDICINE

## 2018-02-20 PROCEDURE — 96365 THER/PROPH/DIAG IV INF INIT: CPT

## 2018-02-20 PROCEDURE — 70450 CT HEAD/BRAIN W/O DYE: CPT | Performed by: EMERGENCY MEDICINE

## 2018-02-20 PROCEDURE — 82962 GLUCOSE BLOOD TEST: CPT

## 2018-02-20 RX ORDER — ACETAMINOPHEN 325 MG/1
650 TABLET ORAL EVERY 6 HOURS PRN
Status: DISCONTINUED | OUTPATIENT
Start: 2018-02-20 | End: 2018-02-21

## 2018-02-20 RX ORDER — HEPARIN SODIUM 5000 [USP'U]/ML
5000 INJECTION, SOLUTION INTRAVENOUS; SUBCUTANEOUS EVERY 12 HOURS SCHEDULED
Status: DISCONTINUED | OUTPATIENT
Start: 2018-02-20 | End: 2018-02-24

## 2018-02-20 RX ORDER — SODIUM CHLORIDE 9 MG/ML
INJECTION, SOLUTION INTRAVENOUS CONTINUOUS
Status: DISCONTINUED | OUTPATIENT
Start: 2018-02-20 | End: 2018-02-24

## 2018-02-20 RX ORDER — ASPIRIN 81 MG/1
81 TABLET ORAL ONCE
Status: COMPLETED | OUTPATIENT
Start: 2018-02-20 | End: 2018-02-20

## 2018-02-20 RX ORDER — LISINOPRIL 10 MG/1
10 TABLET ORAL DAILY
Status: DISCONTINUED | OUTPATIENT
Start: 2018-02-21 | End: 2018-02-24

## 2018-02-20 RX ORDER — NITROFURANTOIN 25; 75 MG/1; MG/1
100 CAPSULE ORAL 2 TIMES DAILY
Qty: 20 CAPSULE | Refills: 0 | Status: ON HOLD | OUTPATIENT
Start: 2018-02-20 | End: 2018-02-20

## 2018-02-20 RX ORDER — SODIUM CHLORIDE 0.9 % (FLUSH) 0.9 %
3 SYRINGE (ML) INJECTION AS NEEDED
Status: DISCONTINUED | OUTPATIENT
Start: 2018-02-20 | End: 2018-02-24

## 2018-02-20 RX ORDER — ONDANSETRON 2 MG/ML
4 INJECTION INTRAMUSCULAR; INTRAVENOUS EVERY 6 HOURS PRN
Status: DISCONTINUED | OUTPATIENT
Start: 2018-02-20 | End: 2018-02-24

## 2018-02-20 NOTE — ED PROVIDER NOTES
Patient Seen in: [facility] Emergency Department    History   Patient presents with:  Urinary Symptoms (urologic)    Stated Complaint: uti, weakness    HPI  Patient is a 51-year-old female who lives at home with her son and was just discharged fro Systems    Positive for stated complaint: uti, weakness  Other systems are as noted in HPI. Constitutional and vital signs reviewed. All other systems reviewed and negative except as noted above.     Physical Exam   ED Triage Vitals [02/20/18 7499]  B

## 2018-02-20 NOTE — ED PROVIDER NOTES
Patient Seen in: [facility] AND [facility] Emergency Department    History   Patient presents with:  Altered Mental Status (neurologic)    Stated Complaint: AMS and pain    HPI    Patient is a 60-year-old female from home with history of dementia who arrives by RONI        Smoking status: Former Smoker                                                              Packs/day: 0.00      Years: 0.00      Smokeless tobacco: Former User                     Alcohol use: No               Comment: None.        Review of Syst Abnormal; Notable for the following:     WBC 3.3 (*)     RBC 3.44 (*)     HGB 9.9 (*)     HCT 29.6 (*)      (*)     Neutrophil Absolute 1.6 (*)     All other components within normal limits   TROPONIN I - Normal   POCT GLUCOSE - Normal   CBC WITH DI observation at home and patient's son would like to try discharge at this time. I did discuss with Dr. King Nascimento who will follow up with patient as outpatient as well.             Disposition and Plan     Clinical Impression:  Urinary tract infection without

## 2018-02-20 NOTE — ED INITIAL ASSESSMENT (HPI)
Pt recently discharged 1hr pta for UTI. Medics called to the home because son could not get pt out of the car due to weakness.

## 2018-02-21 PROBLEM — N39.0 RECURRENT UTI (URINARY TRACT INFECTION): Status: ACTIVE | Noted: 2018-02-21

## 2018-02-21 LAB
ALBUMIN SERPL BCP-MCNC: 3.2 G/DL (ref 3.5–4.8)
ALP SERPL-CCNC: 53 U/L (ref 32–100)
ALT SERPL-CCNC: 10 U/L (ref 14–54)
ANION GAP SERPL CALC-SCNC: 7 MMOL/L (ref 0–18)
AST SERPL-CCNC: 20 U/L (ref 15–41)
BASOPHILS # BLD: 0 K/UL (ref 0–0.2)
BASOPHILS NFR BLD: 0 %
BILIRUB DIRECT SERPL-MCNC: 0.1 MG/DL (ref 0–0.2)
BILIRUB SERPL-MCNC: 0.4 MG/DL (ref 0.3–1.2)
BUN SERPL-MCNC: 20 MG/DL (ref 8–20)
BUN/CREAT SERPL: 18 (ref 10–20)
CALCIUM SERPL-MCNC: 8.7 MG/DL (ref 8.5–10.5)
CHLORIDE SERPL-SCNC: 105 MMOL/L (ref 95–110)
CO2 SERPL-SCNC: 25 MMOL/L (ref 22–32)
CREAT SERPL-MCNC: 1.11 MG/DL (ref 0.5–1.5)
EOSINOPHIL # BLD: 0 K/UL (ref 0–0.7)
EOSINOPHIL NFR BLD: 1 %
ERYTHROCYTE [DISTWIDTH] IN BLOOD BY AUTOMATED COUNT: 14.6 % (ref 11–15)
ERYTHROCYTE [SEDIMENTATION RATE] IN BLOOD: 57 MM/HR (ref 0–42)
GLUCOSE SERPL-MCNC: 105 MG/DL (ref 70–99)
HCT VFR BLD AUTO: 27.3 % (ref 35–48)
HGB BLD-MCNC: 9.2 G/DL (ref 12–16)
LACTATE SERPL-SCNC: 2.1 MMOL/L (ref 0.5–2.2)
LYMPHOCYTES # BLD: 1.3 K/UL (ref 1–4)
LYMPHOCYTES NFR BLD: 42 %
MCH RBC QN AUTO: 28.9 PG (ref 27–32)
MCHC RBC AUTO-ENTMCNC: 33.8 G/DL (ref 32–37)
MCV RBC AUTO: 85.5 FL (ref 80–100)
MONOCYTES # BLD: 0.3 K/UL (ref 0–1)
MONOCYTES NFR BLD: 11 %
NEUTROPHILS # BLD AUTO: 1.4 K/UL (ref 1.8–7.7)
NEUTROPHILS NFR BLD: 45 %
OSMOLALITY UR CALC.SUM OF ELEC: 287 MOSM/KG (ref 275–295)
PLATELET # BLD AUTO: 110 K/UL (ref 140–400)
PMV BLD AUTO: 8.2 FL (ref 7.4–10.3)
POTASSIUM SERPL-SCNC: 4.3 MMOL/L (ref 3.3–5.1)
PROT SERPL-MCNC: 6.5 G/DL (ref 5.9–8.4)
RBC # BLD AUTO: 3.19 M/UL (ref 3.7–5.4)
SODIUM SERPL-SCNC: 137 MMOL/L (ref 136–144)
WBC # BLD AUTO: 3 K/UL (ref 4–11)

## 2018-02-21 PROCEDURE — 83605 ASSAY OF LACTIC ACID: CPT | Performed by: INTERNAL MEDICINE

## 2018-02-21 PROCEDURE — 80048 BASIC METABOLIC PNL TOTAL CA: CPT | Performed by: INTERNAL MEDICINE

## 2018-02-21 PROCEDURE — 97535 SELF CARE MNGMENT TRAINING: CPT

## 2018-02-21 PROCEDURE — 85652 RBC SED RATE AUTOMATED: CPT | Performed by: INTERNAL MEDICINE

## 2018-02-21 PROCEDURE — 97166 OT EVAL MOD COMPLEX 45 MIN: CPT

## 2018-02-21 PROCEDURE — 85025 COMPLETE CBC W/AUTO DIFF WBC: CPT | Performed by: INTERNAL MEDICINE

## 2018-02-21 PROCEDURE — A4216 STERILE WATER/SALINE, 10 ML: HCPCS | Performed by: INTERNAL MEDICINE

## 2018-02-21 PROCEDURE — 80076 HEPATIC FUNCTION PANEL: CPT | Performed by: INTERNAL MEDICINE

## 2018-02-21 RX ORDER — SODIUM CHLORIDE 0.9 % (FLUSH) 0.9 %
3 SYRINGE (ML) INJECTION AS NEEDED
Status: DISCONTINUED | OUTPATIENT
Start: 2018-02-21 | End: 2018-02-24

## 2018-02-21 RX ORDER — ACETAMINOPHEN 325 MG/1
650 TABLET ORAL EVERY 6 HOURS PRN
Status: DISCONTINUED | OUTPATIENT
Start: 2018-02-21 | End: 2018-02-24

## 2018-02-21 RX ORDER — BISACODYL 10 MG
10 SUPPOSITORY, RECTAL RECTAL
Status: DISCONTINUED | OUTPATIENT
Start: 2018-02-21 | End: 2018-02-24

## 2018-02-21 RX ORDER — DOCUSATE SODIUM 100 MG/1
100 CAPSULE, LIQUID FILLED ORAL 2 TIMES DAILY
Status: DISCONTINUED | OUTPATIENT
Start: 2018-02-21 | End: 2018-02-24

## 2018-02-21 RX ORDER — POLYETHYLENE GLYCOL 3350 17 G/17G
17 POWDER, FOR SOLUTION ORAL DAILY PRN
Status: DISCONTINUED | OUTPATIENT
Start: 2018-02-21 | End: 2018-02-24

## 2018-02-21 NOTE — PLAN OF CARE
Problem: Patient/Family Goals  Goal: Patient/Family Long Term Goal  Patient's Long Term Goal: return home    Interventions:  - IV antibiotics  Follow MD orders  - See additional Care Plan goals for specific interventions   Outcome: Progressing    Goal: Nadia Ramirez schedule  Outcome: Progressing      Comments: Patient confused. Miconazole powder ordered for redness under breast and to groin. Turn and reposition every 2 hours and PRN.

## 2018-02-21 NOTE — OCCUPATIONAL THERAPY NOTE
OCCUPATIONAL THERAPY EVALUATION - INPATIENT     Room Number: 562/562-A  Evaluation Date: 2/21/2018  Type of Evaluation: Initial  Presenting Problem: UTI/weakness    Physician Order: IP Consult to Occupational Therapy  Reason for Therapy: ADL/IADL Dysfuncti steps. Patient son reports she is progressing from yesterday, but still below her functional baseline with mobility; anticipate patient will be able to return to home with 24 hour care once medically stable.  Will benefit from 1-2 more treatment sessions to Right      Comment: PC IOL OD/ RJM  8/13/12: CATARACT EXTRACTION W/  INTRAOCULAR LENS IMPLA* Left      Comment: PC IOL OS/ RJM  No date: KNEE REPLACEMENT SURGERY      Comment: both knees were replaced; R Knee Replacement                2010 (Osteoarthritis using toilet, bedpan or urinal? : Total  -   Putting on and taking off regular upper body clothing?: A Lot  -   Taking care of personal grooming such as brushing teeth?: A Little  -   Eating meals?: A Little    AM-PAC Score:  Score: 12  Approx Degree of Im

## 2018-02-21 NOTE — H&P
Oneal Harper Patient Status:  Inpatient    1926 MRN [id number]   Location [facility] 5SW/SE Attending Arnulfo Roque MD   Hosp Day # 1 PCP Nancee Schwab, MD     Date:  2018 ring   • Osteoarthritis     R knee replacement    • Other and unspecified hyperlipidemia    • PVD (posterior vitreous detachment), left eye     OS   • Unspecified essential hypertension      Past Surgical History:  No date:   10/15/12: CAT muscles and no specific pain in her joints that she will recall  Neurological: She did not having difficulty recalling significant fax involving her current situation    Physical Exam:   Vital Signs:  Blood pressure 141/61, pulse 73, temperature 98 °F (36. 12-lead    Result Date: 2/20/2018  ECG Report  Interpretation  -------------------------- Sinus Rhythm Low voltage in precordial leads.  ABNORMAL When compared with ECG of 03/24/2017 17:53:24 No change Electronically signed on 02/20/2018 at 12:37 by Rahul Robledo

## 2018-02-21 NOTE — CM/SW NOTE
SW received MDO for discharge planning. SW left VM for both son/Liborio and son/Andrae. SW to follow up when appropriate.     Harris Fierro, [address]

## 2018-02-21 NOTE — CM/SW NOTE
Met with patient and son at bedside to explain the BPCI/Medicare program. Patient and son agreed with phone follow up for 3 months from [address] [place] [address] after discharge from [facility]. Patient was enrolled under  .  BPCI/Medicare letter and brochure provide

## 2018-02-21 NOTE — PROGRESS NOTES
Discussed CODE STATUS with patient's son Lulu Smith  He emphatically and unequivocally has stated that it is the family's wish as well as the patient's that no resuscitative measures be entertained.

## 2018-02-22 LAB
ALBUMIN SERPL BCP-MCNC: 3.2 G/DL (ref 3.5–4.8)
ALBUMIN/GLOB SERPL: 1 {RATIO} (ref 1–2)
ALP SERPL-CCNC: 49 U/L (ref 32–100)
ALT SERPL-CCNC: 9 U/L (ref 14–54)
ANION GAP SERPL CALC-SCNC: 8 MMOL/L (ref 0–18)
AST SERPL-CCNC: 18 U/L (ref 15–41)
BASOPHILS # BLD: 0 K/UL (ref 0–0.2)
BASOPHILS NFR BLD: 0 %
BILIRUB SERPL-MCNC: 0.4 MG/DL (ref 0.3–1.2)
BUN SERPL-MCNC: 16 MG/DL (ref 8–20)
BUN/CREAT SERPL: 15.2 (ref 10–20)
CALCIUM SERPL-MCNC: 8.9 MG/DL (ref 8.5–10.5)
CHLORIDE SERPL-SCNC: 109 MMOL/L (ref 95–110)
CO2 SERPL-SCNC: 24 MMOL/L (ref 22–32)
CREAT SERPL-MCNC: 1.05 MG/DL (ref 0.5–1.5)
EOSINOPHIL # BLD: 0.1 K/UL (ref 0–0.7)
EOSINOPHIL NFR BLD: 2 %
ERYTHROCYTE [DISTWIDTH] IN BLOOD BY AUTOMATED COUNT: 14.7 % (ref 11–15)
GLOBULIN PLAS-MCNC: 3.2 G/DL (ref 2.5–3.7)
GLUCOSE SERPL-MCNC: 92 MG/DL (ref 70–99)
HCT VFR BLD AUTO: 27.2 % (ref 35–48)
HGB BLD-MCNC: 8.9 G/DL (ref 12–16)
LYMPHOCYTES # BLD: 1.5 K/UL (ref 1–4)
LYMPHOCYTES NFR BLD: 53 %
MAGNESIUM SERPL-MCNC: 2.1 MG/DL (ref 1.8–2.5)
MCH RBC QN AUTO: 28.4 PG (ref 27–32)
MCHC RBC AUTO-ENTMCNC: 32.8 G/DL (ref 32–37)
MCV RBC AUTO: 86.7 FL (ref 80–100)
MONOCYTES # BLD: 0.3 K/UL (ref 0–1)
MONOCYTES NFR BLD: 11 %
NEUTROPHILS # BLD AUTO: 0.9 K/UL (ref 1.8–7.7)
NEUTROPHILS NFR BLD: 33 %
OSMOLALITY UR CALC.SUM OF ELEC: 293 MOSM/KG (ref 275–295)
PLATELET # BLD AUTO: 119 K/UL (ref 140–400)
PMV BLD AUTO: 8.8 FL (ref 7.4–10.3)
POTASSIUM SERPL-SCNC: 4.3 MMOL/L (ref 3.3–5.1)
PROT SERPL-MCNC: 6.4 G/DL (ref 5.9–8.4)
RBC # BLD AUTO: 3.14 M/UL (ref 3.7–5.4)
SODIUM SERPL-SCNC: 141 MMOL/L (ref 136–144)
T3 SERPL-MCNC: 0.66 NG/ML (ref 0.87–1.78)
T4 FREE SERPL-MCNC: 0.89 NG/DL (ref 0.58–1.64)
TSH SERPL-ACNC: 0.1 UIU/ML (ref 0.45–5.33)
WBC # BLD AUTO: 2.8 K/UL (ref 4–11)

## 2018-02-22 PROCEDURE — 80053 COMPREHEN METABOLIC PANEL: CPT | Performed by: INTERNAL MEDICINE

## 2018-02-22 PROCEDURE — 83735 ASSAY OF MAGNESIUM: CPT | Performed by: INTERNAL MEDICINE

## 2018-02-22 PROCEDURE — A4216 STERILE WATER/SALINE, 10 ML: HCPCS | Performed by: INTERNAL MEDICINE

## 2018-02-22 PROCEDURE — 85025 COMPLETE CBC W/AUTO DIFF WBC: CPT | Performed by: INTERNAL MEDICINE

## 2018-02-22 PROCEDURE — 84480 ASSAY TRIIODOTHYRONINE (T3): CPT | Performed by: INTERNAL MEDICINE

## 2018-02-22 PROCEDURE — 84439 ASSAY OF FREE THYROXINE: CPT | Performed by: INTERNAL MEDICINE

## 2018-02-22 PROCEDURE — 97162 PT EVAL MOD COMPLEX 30 MIN: CPT

## 2018-02-22 PROCEDURE — 84443 ASSAY THYROID STIM HORMONE: CPT | Performed by: INTERNAL MEDICINE

## 2018-02-22 NOTE — PLAN OF CARE
Problem: Patient/Family Goals  Goal: Patient/Family Long Term Goal  Patient's Long Term Goal: return home    Interventions:  - IV antibiotics  Follow MD orders  - See additional Care Plan goals for specific interventions    Outcome: Progressing  Possible d Instruct pt to call for assistance with activity based on assessment  - Modify environment to reduce risk of injury  - Provide assistive devices as appropriate  - Consider OT/PT consult to assist with strengthening/mobility  - Encourage toileting schedule

## 2018-02-22 NOTE — PLAN OF CARE
Problem: Patient/Family Goals  Goal: Patient/Family Long Term Goal  Patient's Long Term Goal: return home    Interventions:  - IV antibiotics  Follow MD orders  - See additional Care Plan goals for specific interventions    Outcome: Progressing    Goal: Pa schedule   Outcome: Progressing      Problem: Patient Centered Care  Goal: Patient preferences are identified and integrated in the patient's plan of care  Interventions:  - What would you like us to know as we care for you?  Lives at home with Son  - Jaida Rangel

## 2018-02-22 NOTE — PROGRESS NOTES
[facility]    Progress Note    Munday Shaper Patient Status:  Inpatient    1926 MRN [id number]   Location [facility] 5SW/SE Attending Lynda Ochoa MD   Hosp Day # 2 PCP Waylon Alba MD       Subjective:     Cam (cpt=[id number])    Result Date: 2/20/2018  CONCLUSION:   Stable chest without radiographically evident acute intrathoracic process.                  Sarah Bai MD  2/22/2018

## 2018-02-22 NOTE — PHYSICAL THERAPY NOTE
PHYSICAL THERAPY EVALUATION - INPATIENT     Room Number: 562/562-A  Evaluation Date: 2/22/2018  Type of Evaluation: Initial   Physician Order: PT Eval and Treat    Presenting Problem: p/w AMS, weakness; dx UTI  Reason for Therapy: Mobility Dysfunction and care/supervision;Home;Home with home health PT;Cont skilled therapy in a supervised setting    PLAN  PT Treatment Plan: Bed mobility; Patient education; Family education;Gait training;Strengthening;Balance training;Transfer training  Rehab Potential : Fair LEFT EYE Left      Comment: RONI    HOME SITUATION  Type of Home: House   Home Layout: Two level  Stairs to Enter : 0     Lives With: Son  Drives: No     Patient Regularly Uses: Glasses    Prior Level of Glenn: 24 hour care from sons; supervision lev the patient currently need. ..   -   Moving to and from a bed to a chair (including a wheelchair)?: A Lot   -   Need to walk in hospital room?: Total   -   Climbing 3-5 steps with a railing?: Total     AM-PAC Score:  Raw Score: 10   PT Approx Degree of Impa

## 2018-02-22 NOTE — PHYSICAL THERAPY NOTE
Chart reviewed, attempted to see patient. Patient refusing to participate in therapy today despite maximal encouragement. To f/u at a later time, schedule permitting.

## 2018-02-22 NOTE — PROGRESS NOTES
Patient alert to self only,try to feed patient breakfast this am. Mrs. Licha Tran refused to eat at this time. Will try to attempt again within the hour.

## 2018-02-22 NOTE — PLAN OF CARE
Problem: Patient/Family Goals  Goal: Patient/Family Long Term Goal  Patient's Long Term Goal: return home    Interventions:  - IV antibiotics  Follow MD orders  - See additional Care Plan goals for specific interventions    Outcome: Progressing    Goal: Pa strengthening/mobility  - Encourage toileting schedule   Outcome: Progressing  All fall precautions in place, bed alarm on    Problem: Patient Centered Care  Goal: Patient preferences are identified and integrated in the patient's plan of care  Interventio

## 2018-02-22 NOTE — PROGRESS NOTES
Dr. Akua Her made aware of critical lab result of neutrophils absolute this am. No new orders at this time.

## 2018-02-23 PROCEDURE — 97530 THERAPEUTIC ACTIVITIES: CPT

## 2018-02-23 PROCEDURE — 97535 SELF CARE MNGMENT TRAINING: CPT

## 2018-02-23 PROCEDURE — 97116 GAIT TRAINING THERAPY: CPT

## 2018-02-23 RX ORDER — NITROFURANTOIN 25; 75 MG/1; MG/1
100 CAPSULE ORAL 2 TIMES DAILY
Qty: 34 CAPSULE | Refills: 4 | Status: SHIPPED | OUTPATIENT
Start: 2018-02-23 | End: 2018-03-02

## 2018-02-23 NOTE — PROGRESS NOTES
[facility]    Progress Note    Greig Boast Patient Status:  Inpatient    1926 MRN [id number]   Location [facility] 5SW/SE Attending Nasreen Nunes MD   Hosp Day # 3 PCP Maru Hu MD       Subjective:   Maribel Watson 02/22/2018   PHOS 3.2 05/04/2017   TROP 0.01 02/20/2018                   Edel Ku MD  2/23/2018

## 2018-02-23 NOTE — PLAN OF CARE
MUSCULOSKELETAL - ADULT    • Return mobility to safest level of function Progressing        Patient Centered Care    • Patient preferences are identified and integrated in the patient's plan of care Progressing        Patient/Family Goals    • Patient/Fami

## 2018-02-23 NOTE — OCCUPATIONAL THERAPY NOTE
OCCUPATIONAL THERAPY TREATMENT NOTE - INPATIENT        Room Number: 562/562-A           Presenting Problem: UTI/weakness    Problem List  Principal Problem:    Urinary tract infection without hematuria, site unspecified  Active Problems:    Dementia withou consistent supervision is highly recommended; continued HH to address endurance and activity tolerance for daily tasks. Patient left up in chair, alarm on, and son present. RN notified of patient performance during this treatment session.      DISCHARGE REC reach; All patient questions and concerns addressed; Alarm set; Family present    OT Goals:       OT Goals  Patients self stated goal : per son, to return to PLOF     Patient will complete functional transfer with CGA LRD  Comment:  Mod A for toilet transfers

## 2018-02-23 NOTE — PHYSICAL THERAPY NOTE
PHYSICAL THERAPY TREATMENT NOTE - INPATIENT     Room Number: 562/562-A       Presenting Problem: p/w AMS, weakness; dx UTI    Problem List  Principal Problem:    Urinary tract infection without hematuria, site unspecified  Active Problems:    Dementia with education;Gait training;Transfer training;Balance training    SUBJECTIVE  \"what are we doing? Where are we going? \"    OBJECTIVE  Precautions: None    WEIGHT BEARING RESTRICTION  Weight Bearing Restriction: None     PAIN ASSESSMENT   Rating:  (no c/o pain assistance   Goal #1   Current Status Not assessed   Goal #2 Patient is able to demonstrate transfers Sit to/from Stand at assistance level: minimum assistance with walker - rolling   Goal #2  Current Status Patient performs sit <> stand transfer with roll

## 2018-02-23 NOTE — PHYSICAL THERAPY NOTE
Chart reviewed, attempted to see patient.  Patient very lethargic and difficult to arouse; son present stating that she was ambulating in the horner and to the bathroom this AM with occupational therapy and has just started to rest. To f/u with patient after

## 2018-02-23 NOTE — CM/SW NOTE
CTl met with patients son at bedside regarding discharge planning. Dc order in for 2/24. Physical therapy recommendations are for home with home health care. Currently patient lives at home with son 24 hours.  Patients son states \"I do not know if I want h

## 2018-02-23 NOTE — PLAN OF CARE
Problem: Patient/Family Goals  Goal: Patient/Family Long Term Goal  Patient's Long Term Goal: return home    Interventions:  - IV antibiotics  Follow MD orders  - See additional Care Plan goals for specific interventions    Outcome: Progressing    Goal: Pa schedule   Outcome: Progressing      Problem: Patient Centered Care  Goal: Patient preferences are identified and integrated in the patient's plan of care  Interventions:  - What would you like us to know as we care for you?  Patient/family want to be kept

## 2018-02-24 VITALS
HEART RATE: 68 BPM | DIASTOLIC BLOOD PRESSURE: 61 MMHG | HEIGHT: 62 IN | RESPIRATION RATE: 18 BRPM | SYSTOLIC BLOOD PRESSURE: 137 MMHG | TEMPERATURE: 98 F | OXYGEN SATURATION: 97 % | BODY MASS INDEX: 29.68 KG/M2 | WEIGHT: 161.31 LBS

## 2018-02-24 NOTE — DISCHARGE PLANNING
Discharge instructions given to patient. Patient and son was instructed to follow up with doctor for appointment. Patient and son was given educational handouts that were printed up for new prescription medication ordered by the doctor.  Saline lock removed

## 2018-02-24 NOTE — PLAN OF CARE
Problem: Patient/Family Goals  Goal: Patient/Family Long Term Goal  Patient's Long Term Goal: return home    Interventions:  - IV antibiotics  Follow MD orders  - See additional Care Plan goals for specific interventions    Outcome: Progressing    Goal: Pa schedule   Outcome: Progressing      Problem: Patient Centered Care  Goal: Patient preferences are identified and integrated in the patient's plan of care  Interventions:    - Provide timely, complete, and accurate information to patient/family  - Incorpor

## 2018-02-24 NOTE — HOME CARE LIAISON
DIAGNOSES AND PERTINENT MEDICAL HISTORY: UTI, DEMENTIA, WEAKNESS    FACILITY NAME AND DC DATE: [facility] 2.24.18    BEDSIDE VISIT WITH: PTNT AND SON    SERVICES ORDERED: RN PT    VERIFIED PATIENT ADDRESS, PHONE NUMBER AND CAREGIVER: YES    PCP IS DR. TOPETE

## 2018-02-24 NOTE — PLAN OF CARE
Patient vital signs were taken and stable. Patient and son was educated on all medications administered per doctor orders.  Patient was assisted from bed to chair using walker and with assistance from nurse and patient care technician at 8:20am. Patient Eliana Francis

## 2018-02-24 NOTE — CM/SW NOTE
[facility]. Referral made to [facility] [address].     gaurav alvarado,MARY ANNE [id number]

## 2018-02-24 NOTE — PROGRESS NOTES
[facility]    Progress Note    Sherida Habermann Patient Status:  Inpatient    1926 MRN [id number]   Location [facility] 5SW/SE Attending Tricia Schmidt MD   Hosp Day # 4 PCP Madonna Myers MD     Subjective:     Con

## 2018-02-24 NOTE — PLAN OF CARE
Problem: Patient/Family Goals  Goal: Patient/Family Long Term Goal  Patient's Long Term Goal: return home    Interventions:  - IV antibiotics  Follow MD orders  - See additional Care Plan goals for specific interventions    Outcome: Progressing  (1) Vital Communicate ordered activity level and limitations with patient/family   Outcome: Progressing  (1) Patient is assisted with staff from bed to chair for safety using walker.   (2) Patient is assisted with transfers and ambulation using safe patient equipment to participate in care and decision making.

## 2018-02-27 NOTE — HOME CARE LIAISON
Incoming call received per son Marisol Howard stating he is refusing home health services at this time. Message left for Nhung RAO this AM regarding situation.

## 2018-03-06 PROBLEM — I35.8 AORTIC HEART MURMUR: Status: ACTIVE | Noted: 2018-03-06

## 2018-03-20 ENCOUNTER — HOSPITAL ENCOUNTER (EMERGENCY)
Facility: HOSPITAL | Age: 83
Discharge: HOME | End: 2018-03-20
Attending: EMERGENCY MEDICINE
Payer: MEDICARE

## 2018-03-20 VITALS
DIASTOLIC BLOOD PRESSURE: 65 MMHG | SYSTOLIC BLOOD PRESSURE: 116 MMHG | OXYGEN SATURATION: 95 % | HEART RATE: 62 BPM | RESPIRATION RATE: 18 BRPM | WEIGHT: 166 LBS | BODY MASS INDEX: 30 KG/M2 | TEMPERATURE: 98 F
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
## 2018-03-20 PROCEDURE — 99283 EMERGENCY DEPT VISIT LOW MDM: CPT

## 2018-03-20 NOTE — ED INITIAL ASSESSMENT (HPI)
Patient here for c/o fall while in shower, witnessed by son. Son lowered her to the ground, no loc, no injury. Son wants patient checked, per EMS.  Patient has hx of dementia, alert and oriented x 2 per normal.

## 2018-03-20 NOTE — DISCHARGE SUMMARY
[facility]    Discharge Summary    [facility] Patient Status:  Inpatient    1926 MRN [id number]   Location [facility] 5SW/SE Attending No att. providers found   Hosp Day # 4 PCP Patricia Dodge MD     Date of Admissi NEEDS APPOINTMENT WITH MD FOR ANY FURTHER REFILLS., Normal, Disp-90 tablet, R-0    Calcium 600 MG Oral Tab  Take 600 mg by mouth., Historical    aspirin (ASPIRIN EC LOW DOSE) 81 MG Oral Tab EC  Take  by mouth.  Take one tablet by mouth daily, Historical

## 2018-03-20 NOTE — ED PROVIDER NOTES
Patient Seen in: [facility] AND [facility] Emergency Department    History   Patient presents with:  Fall (musculoskeletal, neurologic)    Stated Complaint: fall    HPI    Patient is a 35-year-old female who is DNR and lives at home with her son who arrives by E Years: 0.00      Smokeless tobacco: Former User                     Alcohol use: No               Comment: None. Review of Systems    Positive for stated complaint: fall  Other systems are as noted in HPI. Constitutional and vital signs reviewed.

## 2018-04-24 ENCOUNTER — APPOINTMENT (OUTPATIENT)
Dept: GENERAL RADIOLOGY | Facility: HOSPITAL | Age: 83
DRG: 871 | End: 2018-04-24
Attending: EMERGENCY MEDICINE
Payer: MEDICARE

## 2018-04-24 ENCOUNTER — HOSPITAL ENCOUNTER (INPATIENT)
Facility: HOSPITAL | Age: 83
LOS: 9 days | Discharge: NURSING, REHAB OR LONG-TERM CARE | DRG: 871 | End: 2018-05-03
Attending: EMERGENCY MEDICINE | Admitting: INTERNAL MEDICINE
Payer: MEDICARE

## 2018-04-24 ENCOUNTER — APPOINTMENT (OUTPATIENT)
Dept: CT IMAGING | Facility: HOSPITAL | Age: 83
DRG: 871 | End: 2018-04-24
Attending: EMERGENCY MEDICINE
Payer: MEDICARE

## 2018-04-24 DIAGNOSIS — R11.2 NAUSEA AND VOMITING IN ADULT: ICD-10-CM

## 2018-04-24 DIAGNOSIS — J18.9 SEPSIS DUE TO PNEUMONIA (HCC): Primary | ICD-10-CM

## 2018-04-24 DIAGNOSIS — J18.9 NOSOCOMIAL PNEUMONIA: ICD-10-CM

## 2018-04-24 DIAGNOSIS — A41.9 SEPSIS DUE TO PNEUMONIA (HCC): Primary | ICD-10-CM
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
## 2018-04-24 PROCEDURE — 71045 X-RAY EXAM CHEST 1 VIEW: CPT | Performed by: EMERGENCY MEDICINE

## 2018-04-24 PROCEDURE — 74176 CT ABD & PELVIS W/O CONTRAST: CPT | Performed by: EMERGENCY MEDICINE

## 2018-04-24 PROCEDURE — 99291 CRITICAL CARE FIRST HOUR: CPT | Performed by: HOSPITALIST

## 2018-04-24 RX ORDER — ACETAMINOPHEN 650 MG/1
650 SUPPOSITORY RECTAL ONCE
Status: COMPLETED | OUTPATIENT
Start: 2018-04-24 | End: 2018-04-24

## 2018-04-24 RX ORDER — ACETAMINOPHEN 325 MG/1
650 TABLET ORAL EVERY 6 HOURS PRN
Status: DISCONTINUED | OUTPATIENT
Start: 2018-04-24 | End: 2018-05-03

## 2018-04-24 RX ORDER — ASCORBIC ACID 500 MG
500 TABLET ORAL DAILY
Status: ON HOLD | COMMUNITY
End: 2018-01-01

## 2018-04-24 RX ORDER — LEVOFLOXACIN 5 MG/ML
500 INJECTION, SOLUTION INTRAVENOUS ONCE
Status: DISCONTINUED | OUTPATIENT
Start: 2018-04-24 | End: 2018-04-24

## 2018-04-24 RX ORDER — PERPHENAZINE/AMITRIPTYLINE HCL 4MG-10MG
1 TABLET ORAL DAILY
Status: ON HOLD | COMMUNITY
End: 2019-01-01

## 2018-04-24 RX ORDER — MELATONIN
1000 DAILY
Status: ON HOLD | COMMUNITY
End: 2019-01-01

## 2018-04-24 RX ORDER — PYRIDOXINE HCL (VITAMIN B6) 100 MG
1 TABLET ORAL DAILY
Status: ON HOLD | COMMUNITY
End: 2019-01-01

## 2018-04-24 RX ORDER — METRONIDAZOLE 500 MG/100ML
500 INJECTION, SOLUTION INTRAVENOUS EVERY 8 HOURS
Status: DISCONTINUED | OUTPATIENT
Start: 2018-04-24 | End: 2018-04-27

## 2018-04-24 RX ORDER — HEPARIN SODIUM 5000 [USP'U]/ML
5000 INJECTION, SOLUTION INTRAVENOUS; SUBCUTANEOUS EVERY 12 HOURS SCHEDULED
Status: DISCONTINUED | OUTPATIENT
Start: 2018-04-24 | End: 2018-05-03

## 2018-04-24 RX ORDER — METRONIDAZOLE 500 MG/100ML
500 INJECTION, SOLUTION INTRAVENOUS EVERY 8 HOURS
Status: DISCONTINUED | OUTPATIENT
Start: 2018-04-24 | End: 2018-04-24 | Stop reason: CLARIF

## 2018-04-24 RX ORDER — SODIUM CHLORIDE 9 MG/ML
INJECTION, SOLUTION INTRAVENOUS CONTINUOUS
Status: DISCONTINUED | OUTPATIENT
Start: 2018-04-24 | End: 2018-04-25

## 2018-04-24 RX ORDER — METRONIDAZOLE 500 MG/100ML
500 INJECTION, SOLUTION INTRAVENOUS ONCE
Status: COMPLETED | OUTPATIENT
Start: 2018-04-24 | End: 2018-04-24

## 2018-04-24 RX ORDER — ONDANSETRON 2 MG/ML
4 INJECTION INTRAMUSCULAR; INTRAVENOUS ONCE
Status: COMPLETED | OUTPATIENT
Start: 2018-04-24 | End: 2018-04-24

## 2018-04-24 RX ORDER — SODIUM CHLORIDE 0.9 % (FLUSH) 0.9 %
3 SYRINGE (ML) INJECTION AS NEEDED
Status: DISCONTINUED | OUTPATIENT
Start: 2018-04-24 | End: 2018-05-03

## 2018-04-24 NOTE — PROGRESS NOTES
[facility] dosing service    Initial Pharmacokinetic Consult for Vancomycin Dosing     Chuck Pineda is a 80year old female admitted on 4/24 who is being treated for sepsis.   Pharmacy has been asked to dose Vancomycin by Dr. Pepe Grimes 77.1kg weight, renal function, and pharmacokinetics. 2.  Pharmacy ordered Vancomycin trough level(s) prior to 3rd dose. Goal trough level 15-20 ug/mL. 3.  Pharmacy will need BUN/Scr daily while on Vancomycin to assess renal function.     4.  Pharmac

## 2018-04-24 NOTE — ED NOTES
Dr. Luna Pin updated with pt status. Lange cath placed, IVF infusing, ordered rectal tylenol given, vitals stable at this time. Will continue to monitor closely.

## 2018-04-24 NOTE — PROGRESS NOTES
RRT    *See RRT Documentation Record*    Reason the RRT was called: BP decrease to 74/36 upon arrival  Assessment of patient leading up to RRT: Patient lethargic, unable to respond, only withdrawing to pain, family states this is near baseline.  Patient den

## 2018-04-24 NOTE — PROGRESS NOTES
rrt critical care seen and stayed with pt 1014 to 1046a critical care  Pt with bp upper 70's low 80's  Asp pneumonia came up from er  ekg tracings, cxr and ct images reviewed  dw mago Lopez and Radha Morales who will take over  Pt responded to fluid bolus an

## 2018-04-24 NOTE — ED PROVIDER NOTES
Patient Seen in: [facility] AND [facility] Department    History   Patient presents with:  Nausea/Vomiting/Diarrhea (gastrointestinal)    Stated Complaint: vomiting    HPI    80-year-old female with history of dementia presents for complaint of nausea INTRAOCULAR LENS IMPLA* Left      Comment: PC IOL OS/ RONI  No date: KNEE REPLACEMENT SURGERY      Comment: both knees were replaced; R Knee Replacement                2010 (Osteoarthritis)  11/25/15: YAG CAPSULOTOMY - OD - RIGHT EYE Right      Comment: RONI rebound, no guarding, no CVA tenderness, no tenderness at McBurney's point and negative Galvan's sign. No hernia. Musculoskeletal: Normal range of motion. Neurological: She is easily aroused. She appears listless. She is disoriented.  No cranial nerve d ---------                               -----------         ------                     ABORH (BLOOD TYPE)[[id number]]                               Final result               ANTIBODY SCREEN[[id number]]                                  Final result reduction was used. FINDINGS:  TECHNIQUE: Multidetector CT images of the abdomen and pelvis were obtained without non-ionic intravenous contrast material. Automated exposure control for dose reduction was used.  Adjustment of the mA and/or kV was done bas catheter. PELVIC NODES: No lymphadenopathy. PELVIC ORGANS: There is a 2.1 x 1.4 cm left ovarian cyst similar in comparison to August, 2006. Pessary device noted. Deep pelvic calcifications likely represent phleboliths.   VASCULATURE:   No aneurysm is dete TIME: 7:02  COMPARISON: [facility], XR CHEST AP PORTABLE (CPT=71010), 2/20/2018, 12:51. INDICATIONS: Weakness and vomiting. TECHNIQUE:   Single view. FINDINGS:  CARDIAC/VASC: The heart is mildly enlarged.  Unremarkable pulmonary vasculat including reassessment of your blood pressure.     Medications Prescribed:  Current Discharge Medication List        Present on Admission  Date Reviewed: 2/21/2018          ICD-10-CM Noted POA    Sepsis due to pneumonia ([facility] [address] J18.9, A41.9 4/24/2018 Unknown

## 2018-04-24 NOTE — CONSULTS
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 No respiratory distress. HEENT: Atraumatic, Lips, mucosa, and tongue normal.  No thrush noted.    Lungs: crackles

## 2018-04-24 NOTE — H&P
Oneal Harper Patient Status:  Inpatient    1926 MRN [id number]   Location [facility] 3W/SW Attending Zuly Caldwell MD   Hosp Day # 0 PCP Lissette Guerrero MD     Date:  2018 fracture ([facility] [address]     C2 fracture after fall   • Cataract 1996    OD, Phaco w/ PC IOL OD 2012; OS, Phaco w/ PC IOL   • Cup to disc asymmetry 1997    \"Cuo Disc Asym\", OU   • Dementia    • ERM OS (epiretinal membrane, left eye) 2013    OS   • ERM OS (epiretin 500 mg by mouth daily. Cranberry 500 MG Oral Tab Take 2 tablets by mouth daily. Phosphatidylserine (NEURO-PS OR) Take 100 mg by mouth daily. Coconut Oil 1000 MG Oral Cap Take 1 capsule by mouth daily. D-MANNOSE OR Take 500 mg by mouth daily.    Diana Bell (L) 04/24/2018   CREATSERUM 1.43 04/24/2018   BUN 45 (H) 04/24/2018    04/24/2018   K 4.6 04/24/2018    04/24/2018   CO2 24 04/24/2018    (H) 04/24/2018   CA 8.8 04/24/2018   ALB 3.4 (L) 04/24/2018   ALKPHO 58 04/24/2018   BILT 0.6 04/24 Atherosclerosis. 5. Demineralization. 6. Scoliosis. 7. Osteoarthritis. 8. Rotator cuff tears. Dictated by (CST): Júnior Avelar MD on 4/24/2018 at 7:23     Approved by (CST):  Júnior Avelar MD on 4/24/2018 at 7:25          Ekg 12-lead    Result D

## 2018-04-24 NOTE — ED INITIAL ASSESSMENT (HPI)
According to EMS they were called to pt's home for complaint of nosebleed. At this time pt is attempting to vomit. Dark brown vomit noted to be on pt's shirt. Brown vomit on pt's face.

## 2018-04-25 PROBLEM — G93.41 ENCEPHALOPATHY IN SEPSIS: Status: ACTIVE | Noted: 2018-04-25

## 2018-04-25 PROBLEM — E43 SEVERE PROTEIN-CALORIE MALNUTRITION (HCC): Status: ACTIVE | Noted: 2018-04-25

## 2018-04-25 RX ORDER — MAGNESIUM SULFATE HEPTAHYDRATE 40 MG/ML
2 INJECTION, SOLUTION INTRAVENOUS ONCE
Status: COMPLETED | OUTPATIENT
Start: 2018-04-25 | End: 2018-04-25

## 2018-04-25 RX ORDER — ACETAMINOPHEN 650 MG/1
650 SUPPOSITORY RECTAL EVERY 6 HOURS PRN
Status: DISCONTINUED | OUTPATIENT
Start: 2018-04-25 | End: 2018-05-03

## 2018-04-25 RX ORDER — DEXTROSE AND SODIUM CHLORIDE 5; .9 G/100ML; G/100ML
INJECTION, SOLUTION INTRAVENOUS CONTINUOUS
Status: DISCONTINUED | OUTPATIENT
Start: 2018-04-25 | End: 2018-05-03

## 2018-04-25 NOTE — PROGRESS NOTES
Pulmonary Progress Note     Assessment / Plan:  1. Sepsis - pulmonary source from aspiration and UTI  - stop IVFs  - lactic acid normal  - abx with cefepime, flagyl and vanc for now.  She has a PCN allergy listed but has received cefepime from the ED withou

## 2018-04-25 NOTE — PLAN OF CARE
Problem: Patient/Family Goals  Goal: Patient/Family Long Term Goal  Patient's Long Term Goal: to return home with son    Interventions:  - IVF  - IV ABX  - Multidisciplinary Team  - Vital Sign Monitoring    - See additional Care Plan goals for specific int intracranial pressure  - Maintain blood pressure and fluid volume within ordered parameters to optimize cerebral perfusion and minimize risk of hemorrhage  - Monitor temperature, glucose, and sodium.  Initiate appropriate interventions as ordered   Outcome:

## 2018-04-25 NOTE — PLAN OF CARE
NEUROLOGICAL - ADULT    • Achieves maximal functionality and self care Not Progressing          NEUROLOGICAL - ADULT    • Achieves stable or improved neurological status Progressing        Patient Centered Care    • Patient preferences are identified and i

## 2018-04-25 NOTE — HOSPICE RN NOTE
[facility] appointment set via phone to explain Hospice POC with Xiang Beverly at [facility] tomorrow.

## 2018-04-25 NOTE — SLP NOTE
ADULT SWALLOWING EVALUATION    ASSESSMENT    ASSESSMENT/OVERALL IMPRESSION:  Pt seen for a bedside swallowing evaluation secondary to new onset of pneumonia. The pt is currently NPO. The pt with history of dementia and lives at home with her son.   The [facility] functional communication measures, pt's swallow level is 3/7.          RECOMMENDATIONS   Diet Recommendations - Solids: Mechanical soft ground  Diet Recommendations - Liquid: Nectar thick (No Straw)                        Compensatory Strateg Situation: Home with support  Diet Prior to Admission: Regular; Thin liquids  Precautions: Aspiration    Patient/Family Goals: to eat safely    SWALLOWING HISTORY  Current Diet Consistency: NPO  Dysphagia History: VFSS 6/15/16: Penetration with no aspiratio GOALS  Goal #1 The patient will tolerate mechanical soft ground consistency and nectar thick liquids without overt signs or symptoms of aspiration with 100 % accuracy over 3 session(s).   In Progress   Goal #2 The patient/family/caregiver will demonstra

## 2018-04-25 NOTE — PROGRESS NOTES
[facility]    Progress Note    David Melgar Patient Status:  Inpatient    1926 MRN [id number]   Location [facility] 3W/SW Attending Jared Chung MD   Hosp Day # 1 PCP Perri Rose MD       Subjective:   Lisa no cyanosis or edema  Neurologic: Disoriented to person place and time responds to questions demeanor angry aggressive and paranoid          Results:       Lab Results  Component Value Date   WBC 9.3 04/25/2018   HGB 7.7 (L) 04/25/2018   HCT 22.9 (L) 04/25 at 8:48     Approved by (CST): Larry Brandt MD on 4/24/2018 at 9:04          Xr Chest Ap Portable  (cpt=71045)    Result Date: 4/24/2018  CONCLUSION:  1. Unfavorable change from February 20, 2018 with right upper lobe pneumonia noted.  2. The rest of the

## 2018-04-26 RX ORDER — FUROSEMIDE 10 MG/ML
20 INJECTION INTRAMUSCULAR; INTRAVENOUS ONCE
Status: COMPLETED | OUTPATIENT
Start: 2018-04-26 | End: 2018-04-26

## 2018-04-26 RX ORDER — POTASSIUM CHLORIDE 20 MEQ/1
40 TABLET, EXTENDED RELEASE ORAL ONCE
Status: COMPLETED | OUTPATIENT
Start: 2018-04-26 | End: 2018-04-26

## 2018-04-26 RX ORDER — LISINOPRIL 5 MG/1
5 TABLET ORAL DAILY
Status: DISCONTINUED | OUTPATIENT
Start: 2018-04-26 | End: 2018-05-03

## 2018-04-26 NOTE — PROGRESS NOTES
Pulmonary Progress Note      NAME: Amanda Eng - ROOM: 322/[facility]-A - MRN: [id number] - Age: 80year old - : 1926    Assessment/Plan:    1.  Acute hypoxic respiratory failure from aspiration pneumonia  - wean O2  - dysphagia diet per speech  - ce 85.1   MCH  28.4   MCHC  33.4   RDW  16.4*   WBC  7.9   PLT  122*     Recent Labs   Lab  04/24/18   0659  04/25/18   0612  04/26/18   0700   GLU  160*  107*  151*   BUN  45*  29*  18   CREATSERUM  1.43  1.29  1.16   GFRAA  37*  42*  47*   GFRNAA  32*  36*

## 2018-04-26 NOTE — PLAN OF CARE
NEUROLOGICAL - ADULT    • Achieves stable or improved neurological status Progressing    • Remains free of injury related to seizure activity Progressing    • Achieves maximal functionality and self care Progressing        Patient Centered Care    • Patien

## 2018-04-26 NOTE — SLP NOTE
SPEECH DAILY NOTE - INPATIENT    ASSESSMENT & PLAN   ASSESSMENT  Pt seen sitting upright in bed for PO trials of current diet. Pt remains on nasal canula following reported aspiration episode upon admission.  Not yet appropriate for trial upgrade to thin li upgrade of general solids consistency and thin liquids liquids without overt signs or symptoms of aspiration with 100 % accuracy over 3 session(s). Not appropriate for upgrade at this date. Goal not addressed.     Goal #4 The patient will utilize compen

## 2018-04-26 NOTE — PLAN OF CARE
Problem: Patient/Family Goals  Goal: Patient/Family Long Term Goal  Patient's Long Term Goal: make patient as comfortable as possible    Interventions:  - continue use of supplemental oxygen  - reposition q2 hours  - meeting with hospice  - See additional

## 2018-04-26 NOTE — PROGRESS NOTES
[facility]    Progress Note    Honorio Akhtar Patient Status:  Inpatient    1926 MRN [id number]   Location [facility] 3W/SW Attending Aleena Stoddard, MD   Hosp Day # 2 PCP Halley Dan MD       Subjective:     Suzanna Zavala PHOS 2.8 04/26/2018   TROP 0.01 04/24/2018       Xr Chest Ap Portable  (cpt=71045)    Result Date: 4/24/2018  CONCLUSION:  1. Unfavorable change from February 20, 2018 with right upper lobe pneumonia noted. 2. The rest of the findings are stable.  3. Card

## 2018-04-26 NOTE — CM/SW NOTE
4/26/18 CM Discharge planning   Pt resides with son who provides 24 hr care. Family is considering comfort care/hospice services. Son has met with [facility] and wanted to speak with other  family member before deciding on hospice services.   SHERRY

## 2018-04-26 NOTE — HOSPICE RN NOTE
Extensive conversation with POA son Monalisa Mortensen on the phone regarding hospice POC and goals of care. He states he needs more time to think about it and will let hospice know his decision, possibly tomorrow. He states he will call Dr Júnior Sidhu himself.  Updated RN o

## 2018-04-27 RX ORDER — LEVOFLOXACIN 750 MG/1
750 TABLET ORAL
Status: DISCONTINUED | OUTPATIENT
Start: 2018-04-27 | End: 2018-04-30

## 2018-04-27 RX ORDER — FUROSEMIDE 10 MG/ML
20 INJECTION INTRAMUSCULAR; INTRAVENOUS ONCE
Status: COMPLETED | OUTPATIENT
Start: 2018-04-27 | End: 2018-04-27

## 2018-04-27 RX ORDER — DILTIAZEM HYDROCHLORIDE 60 MG/1
60 TABLET, FILM COATED ORAL AS NEEDED
Status: DISCONTINUED | OUTPATIENT
Start: 2018-04-27 | End: 2018-05-03

## 2018-04-27 NOTE — PROGRESS NOTES
[facility]    Progress Note    Ciera Jain Patient Status:  Inpatient    1926 MRN [id number]   Location [facility] 3W/SW Attending Desiree Brenner MD   Hosp Day # 3 PCP Stephanie Clarke MD       Subjective:     Emmanuel Aldrich BILT 0.6 04/25/2018   TP 5.6 (L) 04/25/2018   AST 17 04/25/2018   ALT 12 (L) 04/25/2018   INR 1.2 04/25/2018   T4F 0.74 04/25/2018   TSH 0.26 (L) 04/25/2018   LIP 32 04/24/2018   ESRML 57 (H) 02/21/2018   MG 2.0 04/26/2018   PHOS 2.8 04/26/2018   TROP 0.

## 2018-04-27 NOTE — PLAN OF CARE
NEUROLOGICAL - ADULT    • Achieves stable or improved neurological status Not Progressing    • Remains free of injury related to seizure activity Not Progressing    • Achieves maximal functionality and self care Not Progressing        Patient Centered Care

## 2018-04-27 NOTE — PROGRESS NOTES
Pulmonary Progress Note      NAME: Yesi Tavares - ROOM: [address] [place] [address]-A - MRN: [id number] - Age: 80year old - : 1926    Assessment/Plan:  1.  Acute hypoxic respiratory failure from aspiration pneumonia  - wean O2  - dysphagia diet per speech  - cefe RBC  3.15*   HGB  9.0*   HCT  26.8*   MCV  85.1   MCH  28.4   MCHC  33.4   RDW  16.4*   WBC  7.9   PLT  122*     Recent Labs   Lab  04/25/18   0612  04/26/18   0700   GLU  107*  151*   BUN  29*  18   CREATSERUM  1.29  1.16   GFRAA  42*  47*   GFRNAA  36*

## 2018-04-27 NOTE — HOSPICE RN NOTE
[facility] will continue to follow up with this family and see when they are ready for Hospice.

## 2018-04-27 NOTE — PROGRESS NOTES
[facility] Pharmacy Note:  Renal Adjustment for levofloxacin (Chay Kennedy)    Jamie Clutier is a 80year old female who has been prescribed levofloxacin (LEVAQUIN) 500 mg every 24 hrs.   CrCl is estimated creatinine clearance is 24.5 mL/min (based on SCr of 1.16

## 2018-04-28 RX ORDER — POTASSIUM CHLORIDE 20 MEQ/1
40 TABLET, EXTENDED RELEASE ORAL ONCE
Status: COMPLETED | OUTPATIENT
Start: 2018-04-28 | End: 2018-04-28

## 2018-04-28 NOTE — PLAN OF CARE
Patient/Family Goals    • Patient/Family Long Term Goal Not Progressing          NEUROLOGICAL - ADULT    • Achieves stable or improved neurological status Progressing    • Remains free of injury related to seizure activity Progressing    • Achieves maximal

## 2018-04-28 NOTE — PROGRESS NOTES
[facility]    Progress Note    Marizol Arndt Patient Status:  Inpatient    1926 MRN [id number]   Location [facility] 3W/SW Attending Lamin Daly MD   Hosp Day # 4 PCP Gila Baez MD     Subjective:     Cons

## 2018-04-28 NOTE — PROGRESS NOTES
Pulmonary Progress Note        NAME: Chris Saenz - ROOM: 322/322-A - MRN: [id number] - Age: 80year old - : 1926    Past Medical History:   Diagnosis Date   • C2 cervical fracture ([facility] [address]     C2 fracture after fall   • Cataract     OD, Phac 04/26/18   0700   RBC  3.15*   HGB  9.0*   HCT  26.8*   MCV  85.1   MCH  28.4   MCHC  33.4   RDW  16.4*   WBC  7.9   PLT  122*         Recent Labs   Lab  04/26/18   0700  04/27/18   0728  04/28/18   0613   GLU  151*   --   110*   BUN  18   --   23*   CREA

## 2018-04-28 NOTE — PLAN OF CARE
Problem: Patient/Family Goals  Goal: Patient/Family Long Term Goal  Patient's Long Term Goal: make patient as comfortable as possible    Interventions:  - continue use of supplemental oxygen  - reposition q2 hours  - meeting with hospice  - See additional status  INTERVENTIONS  - Assess for and report changes in neurological status  - Initiate measures to prevent increased intracranial pressure  - Maintain blood pressure and fluid volume within ordered parameters to optimize cerebral perfusion and minimize

## 2018-04-29 PROBLEM — I49.9 CARDIAC ARRHYTHMIA: Status: ACTIVE | Noted: 2018-04-29

## 2018-04-29 RX ORDER — POTASSIUM CHLORIDE 20 MEQ/1
40 TABLET, EXTENDED RELEASE ORAL ONCE
Status: COMPLETED | OUTPATIENT
Start: 2018-04-29 | End: 2018-04-29

## 2018-04-29 RX ORDER — PANTOPRAZOLE SODIUM 40 MG/1
40 TABLET, DELAYED RELEASE ORAL
Status: DISCONTINUED | OUTPATIENT
Start: 2018-04-29 | End: 2018-05-03

## 2018-04-29 RX ORDER — DILTIAZEM HYDROCHLORIDE 120 MG/1
120 CAPSULE, COATED, EXTENDED RELEASE ORAL DAILY
Status: DISCONTINUED | OUTPATIENT
Start: 2018-04-29 | End: 2018-05-03

## 2018-04-29 RX ORDER — MELATONIN
325
Status: DISCONTINUED | OUTPATIENT
Start: 2018-04-29 | End: 2018-05-03

## 2018-04-29 NOTE — PROGRESS NOTES
[facility]    Progress Note    David Melgar Patient Status:  Inpatient    1926 MRN [id number]   Location [facility] 3W/SW Attending Jared Chung MD   Hosp Day # 5 PCP Perri Rose MD     Subjective:     Cons combativeness at night--will need to continue with soft restraints as needed. Blood pressure is controlled. However hemoglobin has decreased. There is no evidence for any obvious bleed. Start PPI and iron supplementation.   May need further evaluation i

## 2018-04-29 NOTE — PHYSICAL THERAPY NOTE
PHYSICAL THERAPY EVALUATION - INPATIENT     Room Number: 322/322-A  Evaluation Date: 4/29/2018  Type of Evaluation: Initial   Physician Order: PT Eval and Treat    Presenting Problem: sepsis due to PNA  Reason for Therapy: Mobility Dysfunction and Dischar deficits in preparation for discharge. DISCHARGE RECOMMENDATIONS  PT Discharge Recommendations: 24 hour care/supervision;Home with home health PT    PLAN  PT Treatment Plan: Bed mobility; Body mechanics; Patient education;Gait training;Transfer training;B Right      Comment: RONI  11/18/15: YAG CAPSULOTOMY - OS - LEFT EYE Left      Comment: RONI    HOME SITUATION  Type of Home: House                    Lives With: Son  Drives: No  Patient Owned Equipment: None  Patient Regularly Uses: None (Patient is not rel Stoop/Curb Assistance: Not tested       Bed Mobility: mod A x 2     Transfers: Not tested, RN ask that patient remain in bed due to restraints     Exercise/Education Provided:  Bed mobility  Body mechanics  Transfer training    Patient End of Session:  In

## 2018-04-29 NOTE — SLP NOTE
SPEECH DAILY NOTE - INPATIENT    ASSESSMENT & PLAN   ASSESSMENT  Pt seen upright in bed for diet check and possible upgrade of diet. Pt with poor oral awareness and SLP led all intake. Pt on mechanical soft solid and nectar thick liquid diet.  Pt attempted SLP guiding intake. No overt clinical signs of aspiration on current diet consistencies.    In Progress   Goal #2 The patient/family/caregiver will demonstrate understanding and implementation of aspiration precautions and swallow strategies independently o

## 2018-04-30 ENCOUNTER — APPOINTMENT (OUTPATIENT)
Dept: CT IMAGING | Facility: HOSPITAL | Age: 83
DRG: 871 | End: 2018-04-30
Attending: INTERNAL MEDICINE
Payer: MEDICARE

## 2018-04-30 PROCEDURE — 70450 CT HEAD/BRAIN W/O DYE: CPT | Performed by: INTERNAL MEDICINE

## 2018-04-30 RX ORDER — SULFAMETHOXAZOLE AND TRIMETHOPRIM 800; 160 MG/1; MG/1
1 TABLET ORAL EVERY 12 HOURS SCHEDULED
Status: DISCONTINUED | OUTPATIENT
Start: 2018-04-30 | End: 2018-05-03

## 2018-04-30 NOTE — PROGRESS NOTES
[facility]    Progress Note    Olayinka Trujillo Patient Status:  Inpatient    1926 MRN [id number]   Location [facility] 3W/SW Attending Magno Ríos MD   Hosp Day # 6 PCP Desire Santiago MD       Subjective:     Chelsea Anne

## 2018-04-30 NOTE — PROGRESS NOTES
[facility] Pharmacy Note:  Renal Adjustment for sulfamethoxazole-trimethoprim (BACTRIM)    Jayjay Dumont is a 80year old female who has been prescribed sulfamethoxazole-trimethoprim (BACTRIM) 400-80 mg every 12 hrs.   CrCl is estimated creatinine clearance i

## 2018-04-30 NOTE — PLAN OF CARE
Problem: Safety Risk - Non-Violent Restraints  Goal: Patient will remain free from self-harm  INTERVENTIONS:  - Apply the least restrictive restraint to prevent harm  - Notify patient and family of reasons restraints applied  - Assess for any contributing Initiate emergency measures for life threatening arrhythmias  - Monitor electrolytes and administer replacement therapy as ordered  Outcome: Progressing      Problem: SKIN/TISSUE INTEGRITY - ADULT  Goal: Skin integrity remains intact  INTERVENTIONS  - Asse

## 2018-05-01 RX ORDER — DILTIAZEM HYDROCHLORIDE 120 MG/1
120 CAPSULE, COATED, EXTENDED RELEASE ORAL DAILY
Qty: 30 CAPSULE | Refills: 5 | Status: ON HOLD | OUTPATIENT
Start: 2018-05-02 | End: 2019-01-01

## 2018-05-01 RX ORDER — SULFAMETHOXAZOLE AND TRIMETHOPRIM 800; 160 MG/1; MG/1
1 TABLET ORAL EVERY 12 HOURS SCHEDULED
Qty: 10 TABLET | Refills: 0 | Status: ON HOLD | OUTPATIENT
Start: 2018-05-01 | End: 2018-01-01

## 2018-05-01 RX ORDER — MELATONIN
325
Qty: 90 TABLET | Refills: 0 | Status: ON HOLD | OUTPATIENT
Start: 2018-05-02 | End: 2019-01-01

## 2018-05-01 RX ORDER — ACETAMINOPHEN 325 MG/1
325 TABLET ORAL EVERY 6 HOURS PRN
Qty: 30 TABLET | Refills: 0 | Status: SHIPPED | OUTPATIENT
Start: 2018-05-01

## 2018-05-01 RX ORDER — PANTOPRAZOLE SODIUM 40 MG/1
40 TABLET, DELAYED RELEASE ORAL
Qty: 30 TABLET | Refills: 3 | Status: ON HOLD | OUTPATIENT
Start: 2018-05-02 | End: 2019-01-01

## 2018-05-01 NOTE — DIETARY NOTE
ADULT NUTRITION BRIEF NOTE    Pt is at no nutrition risk.       RECOMMENDATIONS TO MD:  See Nutrition Intervention     NUTRITION DIAGNOSIS/PROBLEM:  No nutrition diagnosis     PATIENT STATUS:  Noted inconsistent oral diet intake, which seems to be related t 04/29/18 0429 74.6 kg (164 lb 6.4 oz)   04/28/18 0500 74.8 kg (165 lb)   04/27/18 0600 77.6 kg (171 lb)   04/26/18 0600 78.8 kg (173 lb 12.8 oz)   04/25/18 0600 79.1 kg (174 lb 6.4 oz)   04/24/18 0649 77.1 kg (170 lb)     Wt Readings from Last 10 Encount

## 2018-05-01 NOTE — PROGRESS NOTES
[facility]    Progress Note    Chris Saenz Patient Status:  Inpatient    1926 MRN [id number]   Location [facility] 3W/SW Attending Jan Ledesma MD   Hosp Day # 7 PCP Monet Fleming MD       Subjective:     Aster Ruelas probably represent central atrophy although can't entirely exclude the possibility of chronic normal pressure hydrocephalus. Fourth ventricle appears normal. 2. Moderate chronic white matter small vessel ischemic changes.  No acute or significant chronic in

## 2018-05-02 NOTE — PHYSICAL THERAPY NOTE
Per RN hold pt is agitated again she has been in good spirits until trying to mobilize her a few minutes ago

## 2018-05-02 NOTE — SLP NOTE
SPEECH DAILY NOTE - INPATIENT    ASSESSMENT & PLAN   ASSESSMENT  Pt seen upright in bed for diet check and to assess candidacy for diet upgrade. SLP led all intake.   Patient was aware and help cup with mild assist.  Pt on mechanical soft ground diet and n Communication: Plan posted at bedside, D/W RN            GOALS - rec goals be continued  x1-2  GOALS  Goal #1 The patient will tolerate mechanical soft ground consistency and nectar thick liquids without overt signs or symptoms of aspiration with 100 % acc

## 2018-05-02 NOTE — CM/SW NOTE
5/2/18  Discharge planning   Spoke with son Javier Mcgregor via phone [phone], discharge planning discussed, [facility] and short term rehab options provided.   Son agreed to rehab at [facility] where pt has been int the past. Referral and medical records faxed to

## 2018-05-02 NOTE — PLAN OF CARE
Problem: Patient/Family Goals  Goal: Patient/Family Long Term Goal  Patient's Long Term Goal: make patient as comfortable as possible    Interventions:  - continue use of supplemental oxygen  - reposition q2 hours  - meeting with hospice  - See additional MD/LIP  - If seizure occurs, turn patient to side and suction secretions as needed  - Reorient patient post seizure  - Seizure pads on all 4 side rails  - Instruct patient/family to notify RN of any seizure activity  - Instruct patient/family to call for a areas of redness and/or skin breakdown  - Initiate interventions, skin care algorithm/standards of care as needed   Outcome: Progressing

## 2018-05-02 NOTE — PROGRESS NOTES
[facility]    Progress Note    Nehal Duenas Patient Status:  Inpatient    1926 MRN [id number]   Location [facility] 3W/SW Attending Amber Smith MD   Hosp Day # 8 PCP Denisha Baird MD       Subjective:     Shamar Valentine prior study. Findings probably represent central atrophy although can't entirely exclude the possibility of chronic normal pressure hydrocephalus. Fourth ventricle appears normal. 2. Moderate chronic white matter small vessel ischemic changes.  No acute or

## 2018-05-03 VITALS
TEMPERATURE: 99 F | WEIGHT: 161 LBS | HEART RATE: 71 BPM | DIASTOLIC BLOOD PRESSURE: 61 MMHG | OXYGEN SATURATION: 95 % | HEIGHT: 62 IN | RESPIRATION RATE: 18 BRPM | BODY MASS INDEX: 29.63 KG/M2 | SYSTOLIC BLOOD PRESSURE: 113 MMHG

## 2018-05-03 NOTE — PLAN OF CARE
Patient discharged on 5/3 at 11:15 am to [facility] via ambulance. Report given to April. Belongings gathered and sent with patient. Discharge instructions sent with patient. Prescriptions sent with patient. Tele monitor and IV removed.

## 2018-07-12 NOTE — DISCHARGE SUMMARY
[facility]    Discharge Summary    [facility] Patient Status:  Inpatient    1926 MRN [id number]   Location [facility] 3W/SW Attending No att. providers found   [address] Day # 9 PCP Sarika Villalba MD     Date of Admissio Oral Capsule SR 24 Hr  Take 1 capsule (120 mg total) by mouth daily. , Normal, Disp-30 capsule, R-5    ferrous sulfate 325 (65 FE) MG Oral Tab EC  Take 1 tablet (325 mg total) by mouth daily with breakfast., Print Script, Disp-90 tablet, R-0    Sulfamethoxa

## 2018-11-16 PROBLEM — L89.219 PRESSURE INJURY OF SKIN OF RIGHT HIP: Status: ACTIVE | Noted: 2018-01-01

## 2018-11-16 PROBLEM — L89.219: Status: ACTIVE | Noted: 2018-01-01

## 2018-11-16 NOTE — ED NOTES
Care assumed from EMS. Pt presents from [facility] with c/o wound to R hip. Would is closed, non-draining, + eschar, measuring 3 cm x 2 cm. Pt alert, oriented to self only per baseline, HX dementia. + contractures of BUE.  No acute dist

## 2018-11-16 NOTE — ED NOTES
Orders for admission, patient's son is aware of plan and ready to go upstairs. Plan explained to pt, pt unable to verbalize understanding d/t baseline mental status.  Any questions, please call ED JAMES Pfeiffer  at extension [phone]

## 2018-11-16 NOTE — H&P
[facility] HOSP - [facility]    History and Physical    Nehal Ra Patient Status:  Emergency    1926 MRN [id number]   Location [address] Attending Alfa Perrin MD   Hosp Day # 0 PCP MD BENJAMIN Agudelo of Onset   • Cancer Father    • Other (Other) Father    • Hypertension Mother    • Glaucoma Brother    • Cancer Brother    • Diabetes Neg    • Macular degeneration Neg      Social History:  Social History    Tobacco Use      Smoking status: Former Smoker 04/24/2018    B12 665 04/27/2018               Assessment/Plan:   Infected decubitus ulcer right hip  Admit/ surgical consult/ wound care/ will hold off on antibiotics until after surgical procedure tomorrow  Advanced dementia  Poor prognosis  Malnutrition

## 2018-11-16 NOTE — ED PROVIDER NOTES
Patient Seen in: [facility] AND [facility] Emergency Department    History   Patient presents with:  Laceration Abrasion (integumentary)    Stated Complaint: wound    HPI     Incomplete history due to the patient's baseline dementia    80-year-old female patient Systems    Positive for stated complaint: wound  Other systems are as noted in HPI. Constitutional and vital signs reviewed. All other systems reviewed and negative except as noted above.     Physical Exam     ED Triage Vitals [11/16/18 1512]    skilled nursing home where she may receive wound care management. Will admit patient to the service for surgical consultation for appropriate wound care and management. Dr. Juan Bansal on consult.   Admission disposition: 11/16/2018  4:37 PM

## 2018-11-17 NOTE — PLAN OF CARE
Problem: Patient Centered Care  Goal: Patient preferences are identified and integrated in the patient's plan of care  Interventions:  - What would you like us to know as we care for you?  Sons are involved in care  - Provide timely, complete, and accurate tolerance for standing, transferring and ambulating w/ or w/o assistive devices  - Assist with transfers and ambulation using safe patient handling equipment as needed  - Ensure adequate protection for wounds/incisions during mobilization  - Obtain PT/OT c

## 2018-11-17 NOTE — PROGRESS NOTES
Dr. Sinai Quiles notified of BPA fired for r/o sepsis with vs post surgery. Patient has dementia and refusing oral temp. Vs rechecked at 12:45 and temp 97.5 axillary,  No new orders continue to monitor patient.

## 2018-11-17 NOTE — PROGRESS NOTES
[facility] HOSP - [facility]    Progress Note    Audrey Shepard Patient Status:  Inpatient    1926 MRN [id number]   Location [facility] 5SW/SE Attending Sharri Harp MD   Hosp Day # 1 PCP Nestor Thomas MD        Subjective:     Bhavani Bajwa CREATSERUM 0.98 11/17/2018    BUN 17 11/17/2018     (L) 11/17/2018    K 4.5 11/17/2018    CL 99 11/17/2018    CO2 28 11/17/2018     (H) 11/17/2018    CA 8.6 11/17/2018    ALB 3.4 (L) 06/26/2018    ALKPHO 60 06/26/2018    BILT 0.4 06/26/2018

## 2018-11-17 NOTE — CONSULTS
[facility]    CONSULTATION REPORT    Steffi Fee  :1926  BCU:[id number]  LOS:1    Date of Admission:  2018  Date of Consult:  2018     Reason for Consultation: Decubitus ulcer      History of Present Illness:  Cat that she has quit smoking. She has quit using smokeless tobacco. She reports that she drinks alcohol. She reports that she does not use drugs.      Allergies:    Penicillins                 Comment:Other reaction(s): Unknown    Medications:     Current Faci 55*  58*   GFRNAA  48*  50*   CA  8.7  8.6   NA  137  134*   K  4.5  4.5   CL  98  99   CO2  30  28       Lab Results   Component Value Date    WBC 2.9 11/17/2018    HGB 7.5 11/17/2018    HCT 23.0 11/17/2018     11/17/2018     11/17/2018    K patient:  30 Minutes

## 2018-11-17 NOTE — ANESTHESIA PREPROCEDURE EVALUATION
Anesthesia PreOp Note    HPI:     David Melgar is a 80year old female who presents for preoperative consultation requested by: Aaron Montelongo MD    Date of Surgery: 11/16/2018 - 11/17/2018    Procedure(s):   I AND D ISCHIAL OR SACRAL DEBRIDEMENT Noted: 11/10/2015      Cystocele         Date Noted: 09/27/2014      Epiretinal membrane, left eye         Date Noted: 07/24/2014      Posterior capsule opacification         Date Noted: 07/24/2014      Pseudophakia of both eyes         Date Noted: 07/24/2 mouth daily.  Disp: 30 capsule Rfl: 5    ferrous sulfate 325 (65 FE) MG Oral Tab EC Take 1 tablet (325 mg total) by mouth daily with breakfast. Disp: 90 tablet Rfl: 0 Unknown at Unknown time   Sulfamethoxazole-TMP -160 MG Oral Tab per tablet Take 1 ta file.      Penicillins                 Comment:Other reaction(s): Unknown    Family History   Problem Relation Age of Onset   • Cancer Father    • Other (Other) Father    • Hypertension Mother    • Glaucoma Brother    • Cancer Brother    • Diabetes Neg 11/17/2018    HCT 23.0 (L) 11/17/2018    MCV 82.4 11/17/2018    MCH 26.7 (L) 11/17/2018    MCHC 32.4 11/17/2018    RDW 19.6 (H) 11/17/2018     11/17/2018    MPV 7.1 (L) 11/17/2018     Lab Results   Component Value Date     (L) 11/17/2018    K complications, and any alternative forms of anesthetic management. All of the patient's questions were answered to the best of my ability. The patient desires the anesthetic management as planned.   HUNA GONZALEZ  11/17/2018 10:18 AM

## 2018-11-17 NOTE — OPERATIVE REPORT
[facility] OPERATIVE REPORT:     PATIENT NAME: Moy Umana  : 1926   CSN: [id number]    DATE OF OPERATION:   18    PREOPERATIVE DIAGNOSIS: Right trochanteric decubitus wound/ulcer     POSTOPERATIVE DIAGNOSIS: Same     PROCEDURE PERFORMED: Leatha Heredia

## 2018-11-17 NOTE — ANESTHESIA POSTPROCEDURE EVALUATION
Patient: Ciera Jain    Procedure Summary     Date:  11/17/18 Room / Location:  [address] [place] [address] MAIN OR 04 / [address] [facility] MAIN OR    Anesthesia Start:  2240 Anesthesia Stop:  1109    Procedure:   I AND D ISCHIAL OR SACRAL DEBRIDEMENT (Right ) Diagnosis:       Sacral decu

## 2018-11-17 NOTE — ANESTHESIA PROCEDURE NOTES
ANESTHESIA INTUBATION  Urgency: elective    Airway not difficult    General Information and Staff    Patient location during procedure: OR  Anesthesiologist: Tracey Hutchison MD  Performed: anesthesiologist     Indications and Patient Condition  Indication

## 2018-11-18 NOTE — PLAN OF CARE
Problem: Patient Centered Care  Goal: Patient preferences are identified and integrated in the patient's plan of care  Interventions:  - What would you like us to know as we care for you?  Sons are involved in care  - Provide timely, complete, and accurate Incontinence care provided. Repositioning. Call light within reach. Bed alarm in use.

## 2018-11-18 NOTE — RESPIRATORY THERAPY NOTE
RESPIRATORY THERAPY CPAP PROGRESS NOTE:ALAYNA ASSESSMENT:    Pt does not have a previous diagnosis of ALAYNA. Pt does not routinely use a CPAP device at home.  This pt is not suspected to be at high risk for ALAYNA and sleep lab packet was not provided to patient fo

## 2018-11-18 NOTE — PROGRESS NOTES
[facility]    Progress Note    Olayinka Trujillo Patient Status:  Inpatient    1926 MRN [id number]   Location [facility] 5SW/SE Attending Connor Ramirez MD   Hosp Day # 2 PCP Desire Santiago MD        Subjective:     Tone Ureña with her son      Results:     Lab Results   Component Value Date    WBC 3.6 (L) 11/18/2018    HGB 7.1 (L) 11/18/2018    HCT 22.3 (L) 11/18/2018     11/18/2018    CREATSERUM 0.96 11/18/2018    BUN 13 11/18/2018     (L) 11/18/2018    K 4.4 11/1

## 2018-11-19 NOTE — PROGRESS NOTES
[facility]    Progress Note    Gerri Yates Patient Status:  Inpatient    1926 MRN [id number]   Location [facility] 5SW/SE Attending Ayaz Mckeon MD   Hosp Day # 3 PCP Gali Mackay MD     Subjective:     POD #2 s 26.7*  26.4*   --   26.8*   MCHC  32.4  31.9*   --   32.5   RDW  19.6*  19.6*   --   18.8*   WBC  2.9*  3.6*   --   3.7*   PLT  233  216   --   208     Lab Results   Component Value Date    INR 1.1 11/17/2018       Recent Labs   Lab  11/16/18   1543  11/17

## 2018-11-19 NOTE — DIETARY NOTE
ADULT NUTRITION INITIAL ASSESSMENT    Pt is at moderate nutrition risk. Pt does not meet malnutrition criteria.      RECOMMENDATIONS TO MD:  See Nutrition Intervention  Supplements to halt wt loss     ADMITTING DIAGNOSIS:   Pressure injury of skin of right 336 hrs:   Weight   11/16/18 1924 64.8 kg (142 lb 12.8 oz)   11/16/18 1508 73 kg (160 lb 15 oz)     Wt Readings from Last 10 Encounters:  11/16/18 : 64.8 kg (142 lb 12.8 oz)  05/03/18 : 73 kg (161 lb)  03/20/18 : 75.3 kg (166 lb 0.1 oz)  03/05/18 : 75.3 kg

## 2018-11-19 NOTE — WOUND PROGRESS NOTE
WOUND CARE NOTE      PLAN   Recommendations:  VAC standing orders  VAC troubleshooting instructions on the machine  Staff to monitor VAC seal and repair seal if indicated.   Follow VAC troubleshooting instructions  Routine VAC dressing changes  Heels elev

## 2018-11-19 NOTE — CONSULTS
[facility] AND [facility] Infectious Disease  Report of Consultation    Nehal Ra Patient Status:  Inpatient    1926 MRN [id number]   Location [facility] 5SW/SE Attending Pierce Santos MD   Hosp Day # 3 PCP Denisha Baird MD 8/13/12    PC IOL OS/ RJM   • I AND D ISCHIAL OR SACRAL DEBRIDEMENT Right 11/17/2018    Performed by Rosemarie Moore MD at [facility]   • KNEE REPLACEMENT SURGERY      both knees were replaced; R Knee Replacement 2010 (Osteoarthritis)   • YAG CAPSULOTO pain, palpitations, irregular heart beats. Gastrointestinal:  No abdominal pain, nausea, vomiting, diarrhea, or constipation. Genitourinary:  No dysuria, hematuria, urine urgency or frequency.    Integument/breast: Negative for rash, skin lesions, and p rashes or lesions. Neurological: No focal neurologic deficits.     Lab Data Review:  Lab Results   Component Value Date    WBC 3.7 11/19/2018    HGB 8.6 11/19/2018    HCT 26.4 11/19/2018     11/19/2018        Cultures:   Klebsiella  Enterococcus

## 2018-11-19 NOTE — PHYSICAL THERAPY NOTE
PHYSICAL THERAPY QUICK EVALUATION - INPATIENT    Room Number: 536/536-A  Evaluation Date: 11/19/2018  Presenting Problem: p/w infected R hip decubitus ulcer  Physician Order: PT Eval and Treat    Problem List  Principal Problem:    Pressure injury of ski severe dementia. OT contacted son and pt's hx is as follows: pt has experienced progressive decline in function over last 6 weeks. Pt moved to senior living memory care facility, has been mostly bed or w/c bound, receives assist for all ADLs.  Pt is up to c Score:  Raw Score: 10   PT Approx Degree of Impairment Score: 76.75%   Standardized Score (AM-PAC Scale): 32.29   CMS Modifier (G-Code): CL      FUNCTIONAL ABILITY STATUS  Gait Assessment  Gait Assistance: Not tested  Stoop/Curb Assistance: Not tested  Com breaks needed d/t pt agitation with quick movements, encouraging words and comfort provided. Pt tolerated sitting ~10 min at EOB with mostly MOD-MAX A for seated balance, bouts of SBA when pt attending to task and following commands.  Pt transferred via sta time.  Patient discharged from Physical Therapy services. Please re-order if a new functional limitation presents during this admission. GOALS  Patient was able to achieve the following goals . ..     Patient was able to transfer At previous, functional

## 2018-11-19 NOTE — OCCUPATIONAL THERAPY NOTE
OCCUPATIONAL THERAPY EVALUATION - INPATIENT     Room Number: 536/536-A  Evaluation Date: 11/19/2018  Type of Evaluation: Quick Eval  Presenting Problem: (pressure injury of skin on R hip)    Physician Order: IP Consult to Occupational Therapy  Reason for Therapy needs  at this time. Patient will be discharged from Occupational Therapy services. Please re-order if a new functional limitation presents during this admission.     OCCUPATIONAL THERAPY MEDICAL/SOCIAL HISTORY     Problem List  Principal Problem: height toilet  Shower/Tub and Equipment: Walk-in shower             Drives: No       Stairs in Home: 0  Use of Assistive Device(s): w/c, RW,     Prior Level of [place]: PLOF obtained from son via telephone.  Per son report patient has been at [address] [place] [address] 149 clothing?: A Lot  -   Taking care of personal grooming such as brushing teeth?: A Lot  -   Eating meals?: A Lot    AM-PAC Score:  Score: 11  Approx Degree of Impairment: 70.42%  Standardized Score (AM-PAC Scale): 29.04  CMS Modifier (G-Code): CL    FUNCTIO

## 2018-11-20 NOTE — PLAN OF CARE
MUSCULOSKELETAL - ADULT    • Return mobility to safest level of function Progressing        Patient Centered Care    • Patient preferences are identified and integrated in the patient's plan of care Progressing        SKIN/TISSUE INTEGRITY - ADULT    • Ski

## 2018-11-20 NOTE — WOUND PROGRESS NOTE
Pt seen for wound vac check. Vac is running at 125 mmHg and shows no leak. Dressing is c/d/i on the right hip. Next wound vac dressing change is due on Wednesday 11/21.  Plan to d/c to [facility] and continue vac therapy as ordered by Dr. Shirley Daniels

## 2018-11-20 NOTE — CM/SW NOTE
SW spoke w/ son/Liborio in regards to d/c plans. Pt is from [facility] in [facility]. Son requested referrals to 1) [address] 2) [facility] 3 3) [address] Son stated he does not want pt to go to any of the [facility]. Sw requested DON from [address] [facility] [address] [facility] [address]  Pt is marco

## 2018-11-20 NOTE — PROGRESS NOTES
[facility] - [facility]    Progress Note    Corby Sonia Patient Status:  Inpatient    1926 MRN [id number]   Location [facility] 5SW/SE Attending Cassius Duenas MD   Hosp Day # 3 PCP Jaylene Whittington MD        Subjective:     Brittney Foreman alfonzotor wbc , outpatient workup     Discussed with her son/hgb 8.6 today/discharge planning/ d/w social service      Results:     Lab Results   Component Value Date    WBC 3.7 (L) 11/19/2018    HGB 8.6 (L) 11/19/2018    HCT 26.4 (L) 11/19/2018

## 2018-11-20 NOTE — PROGRESS NOTES
[facility] AND [facility] Infectious Disease  Progress Note    [facility] Patient Status:  Inpatient    1926 MRN [id number]   Location [facility] 5SW/SE Attending Geni Byrd MD   Hosp Day # 4 PCP Sarika Villalba MD     Subjective: PICC and a few weeks of IV therapies - through ~12/9/18. D/w patient's son, will order PICC today. Plans for ECF at d/c. Nutritional support ongoing. Will follow.     Yoly Jhaveri [facility]  [facility] Infectious Disease  [phone]    11/20/2018  3:24

## 2018-11-21 NOTE — PLAN OF CARE
Problem: Patient Centered Care  Goal: Patient preferences are identified and integrated in the patient's plan of care  Interventions:  - What would you like us to know as we care for you?  Sons are involved in care  - Provide timely, complete, and accurate Progressing  Woundvac to right hip wound is in place and intact.     Problem: MUSCULOSKELETAL - ADULT  Goal: Return mobility to safest level of function  INTERVENTIONS:  - Assess patient stability and activity tolerance for standing, transferring and ambula

## 2018-11-21 NOTE — PROGRESS NOTES
[facility]    Progress Note    Ed Tang Patient Status:  Inpatient    1926 MRN [id number]   Location [facility] 5SW/SE Attending Geni Byrd MD   Hosp Day # 5 PCP Sarika Villalba MD        Subjective:     Abdirahman Acosta Value Date    WBC 4.9 11/20/2018    HGB 9.0 (L) 11/20/2018    HCT 27.7 (L) 11/20/2018     11/20/2018    CREATSERUM 0.96 11/18/2018    CREATSERUM 0.96 11/18/2018    BUN 13 11/18/2018    BUN 13 11/18/2018     (L) 11/18/2018     (L) 11/18/2

## 2018-11-21 NOTE — WOUND PROGRESS NOTE
Pt seen for wound vac check. Vac is running at 125 mmHg, dressing is patent. There is scant serosanguinous drainage in vac canister. Pt is planned to d/c to snf today, possibly [address] who is aware of vac need.  Bedside rn to remove vac dressing and len

## 2018-11-21 NOTE — CM/SW NOTE
Addend 402p:    Magali from [facility] stated they are unable to accept due to inability to accommodate pt's needs and recommended a locked memory care facility. Gregory Logan from [address] [place] [address] stated they are able to accept.  If pt's son chooses [facility]

## 2018-11-21 NOTE — WOUND PROGRESS NOTE
Pt seen for wound vac dressing change. Pt family now plans for [facility] and may not d/c until Friday per bedside RN. Right hip wound vac dressing removed, wound cleansed with saline. 1 pc black vac sponge placed into wound bed.  Skin prep applied to pal wou

## 2018-11-21 NOTE — PROGRESS NOTES
[facility] AND [facility] Infectious Disease  Progress Note    Dayana Warner Patient Status:  Inpatient    1926 MRN [id number]   Location [facility] 5SW/SE Attending Catarino Huynh MD   Hosp Day # 5 PCP Emily Faye MD     Subjective:  Disposition - stable for ECF from ID standpoint.  Trending temps and WBCs.  Local wound care ongoing post-op.  Continue IV tygacil as Rx for now.  d/w patient's son Isabel Ellington, he agrees to PICC and a few weeks of IV therapies - through ~12/9/18. Rx on chart.

## 2018-11-21 NOTE — PROGRESS NOTES
[facility]    Progress Note    Unionville Shaper Patient Status:  Inpatient    1926 MRN [id number]   Location [facility] 5SW/SE Attending Camila Mack MD   Hosp Day # 5 PCP Waylon Alba MD        Subjective:     Noelle Castro Component Value Date    WBC 4.9 11/20/2018    HGB 9.0 (L) 11/20/2018    HCT 27.7 (L) 11/20/2018     11/20/2018    CREATSERUM 0.96 11/18/2018    CREATSERUM 0.96 11/18/2018    BUN 13 11/18/2018    BUN 13 11/18/2018     (L) 11/18/2018

## 2018-11-21 NOTE — PROGRESS NOTES
[facility]    Progress Note    Jamie Hemingway Patient Status:  Inpatient    1926 MRN [id number]   Location [facility] 5SW/SE Attending Rudi Maldonado MD   Hosp Day # 5 PCP Carla Montanez MD     Subjective:     POD #4 s 31.9*   --   32.5  32.3   RDW  19.6*   --   18.8*  19.0*   WBC  3.6*   --   3.7*  4.9   PLT  216   --   208  202     Lab Results   Component Value Date    INR 1.1 11/17/2018       Recent Labs   Lab  11/16/18   1543  11/17/18   0551  11/18/18   0651   GLU

## 2018-11-22 NOTE — PROGRESS NOTES
[facility] AND [facility] Infectious Disease  Progress Note    Pancho Query Patient Status:  Inpatient    1926 MRN [id number]   Location [facility] 5SW/SE Attending Howard Pearce MD   Hosp Day # 6 PCP Rl Sanabria MD     Subjective: through ~12/9/18. Rx on chart. Will follow. Yoly Hobbs [facility] Infectious Disease  [phone]    11/22/2018  7:56 AM

## 2018-11-23 NOTE — CM/SW NOTE
Pt able to d/c today. Family agreeable w/ pt going to [facility]. RN agreeable w/ 1230p d/c time  [facility] aware of d/c time  SW met w/ pt's son/Andrae; agreeable.  Per son, he will contact son/Liborio  MAIRA contacted [facility] for ambulance (dementia, compl

## 2018-11-23 NOTE — CM/SW NOTE
MAIRA received voicemail from pt's son, Kole Estevez, indicating that he is now agreeable w/ pt going to [facility]. MAIRA left a voicemail for [employer] of [facility] w/ update and requested that they order pt's wound vac. Plan for discharge Friday 11/23.  Will con

## 2018-11-23 NOTE — PLAN OF CARE
MUSCULOSKELETAL - ADULT    • Return mobility to safest level of function Adequate for Discharge        Patient Centered Care    • Patient preferences are identified and integrated in the patient's plan of care Adequate for Discharge        Patient/Family G

## 2018-11-23 NOTE — PROGRESS NOTES
[facility] - [facility]    Progress Note    Audrey Shepard Patient Status:  Inpatient    1926 MRN [id number]   Location [facility] 5SW/SE Attending Sharri Harp MD   Hosp Day # 7 PCP Nestor Thomas MD     Subjective:     POD #6 s Recent Labs   Lab  11/17/18   0551  11/18/18   0651  11/23/18   0736   GLU  103*  92  98  90   BUN  17  13  13  25*   CREATSERUM  0.98  0.96  0.96  0.91   GFRAA  58*  59*  59*  >60   GFRNAA  50*  52*  52*  55*   CA  8.6  8.3*  8.4*  8.0*   ALB   --

## 2018-11-23 NOTE — PROGRESS NOTES
[facility]    Progress Note    Pau Valentin Patient Status:  Inpatient    1926 MRN [id number]   Location [facility] 5SW/SE Attending Camron Goldsmith MD   Hosp Day # 7 PCP Dona Schaumann, MD        Subjective:     Marisela Trevino planning to snf d/w social service/ d/w son      Results:     Lab Results   Component Value Date    WBC 4.9 11/20/2018    HGB 9.0 (L) 11/20/2018    HCT 27.7 (L) 11/20/2018     11/20/2018    CREATSERUM 0.96 11/18/2018    CREATSERUM 0.96 11/18/2018

## 2018-11-23 NOTE — PROGRESS NOTES
[facility] AND [facility] Infectious Disease Progress Note    Rome Given Patient Status:  Inpatient    1926 MRN [id number]   Location [facility] 5SW/SE Attending Walter Manley MD   Hosp Day # 7 PCP Anson Sommers MD     Subjective: rotation and lateral flexion of cervical spine. No JVD. Supple. Lungs: Clear to auscultation bilaterally. Cardiac: + murmur  Abdomen:  Soft, non-distended, non-tender, with no rebound or guarding. No peritoneal signs. No ascites.   Liver is within norm

## 2018-12-31 PROBLEM — E86.0 DEHYDRATION: Status: ACTIVE | Noted: 2018-01-01

## 2018-12-31 PROBLEM — D64.9 ANEMIA, UNSPECIFIED TYPE: Status: ACTIVE | Noted: 2018-01-01

## 2018-12-31 PROBLEM — A41.9 SEPSIS (HCC): Status: ACTIVE | Noted: 2018-01-01

## 2018-12-31 PROBLEM — R50.9 FEVER, UNSPECIFIED FEVER CAUSE: Status: ACTIVE | Noted: 2018-01-01

## 2018-12-31 PROBLEM — A41.9 SEPSIS, DUE TO UNSPECIFIED ORGANISM: Status: ACTIVE | Noted: 2018-01-01

## 2018-12-31 NOTE — CONSULTS
[facility] AND [facility] Infectious Disease  Report of Consultation    Nehal Duenas Patient Status:  Inpatient    1926 MRN [id number]   Location [facility] 3W/SW Attending Pierce Santos MD   Hosp Day # 0 PCP James Barger MD     Date Procedure Laterality Date   •      • CATARACT EXTRACTION W/  INTRAOCULAR LENS IMPLANT Right 10/15/12    PC IOL OD/ RJM   • CATARACT EXTRACTION W/  INTRAOCULAR LENS IMPLANT Left 12    PC IOL OS/ RJM   • I AND D ISCHIAL OR SACRAL DEBRIDEMENT No h/o anxiety, depression, other psych d/o. Endocrine: No history of of diabetes, thyroid disorder. Remainder of 12 point review of systems otherwise negative.     Vital signs in last 24 hours:  Patient Vitals for the past 24 hrs:   BP Temp Temp src P Plan:    1.   Sepsis with high fevers, AMS, and source unclear  - Recent infected R hip wound with necrosis - appears improved  - UA negative  - CXR negative  - Occult bacteremia/line sepsis possible - blood cultures pending and likely will d/c PICC soon  -

## 2018-12-31 NOTE — TELEPHONE ENCOUNTER
[facility] called at [phone] regarding patient. Attempted to call back and phone kept ringing and then disconnected X 2. Small emesis. Fights. Another episode. 103.9 now. Referred to ER. Message to PCP.

## 2018-12-31 NOTE — H&P
[facility]    History and Physical    Sherida Habermann Patient Status:  Inpatient    1926 MRN [id number]   Location [facility] 3W/SW Attending Cristiane Avila MD   Hosp Day # 0 PCP Kaity Grier MD     Date:  2018  Da Family History   Problem Relation Age of Onset   • Cancer Father    • Other (Other) Father    • Hypertension Mother    • Glaucoma Brother    • Cancer Brother    • Diabetes Neg    • Macular degeneration Neg      Social History:  Social History    Tobacco Us lisinopril 10 MG Oral Tab Take 1 tablet (10 mg total) by mouth daily. NEEDS APPOINTMENT WITH MD FOR ANY FURTHER REFILLS. Calcium 600 MG Oral Tab Take 500 mg by mouth.          Review of Systems:     Unable to perform ROS: Dementia       Physical Exam:   V Left cervical: No superficial cervical, no deep cervical and no posterior cervical adenopathy present. Right: No supraclavicular adenopathy present. Left: No supraclavicular adenopathy present. Skin: Skin is warm and dry.  No rash noted Fever, unspecified fever cause  Due to above. Anemia, unspecified type  History of chronic anemia. No evidence of acute bleed. Will check iron parameters TSH and B12. Monitor. Transfuse if less than 7. Type and screen. Tachycardia.   Impro

## 2018-12-31 NOTE — ED INITIAL ASSESSMENT (HPI)
Pt arrives via medics from nursing home for eval of ams, fevers, vomiting. Symptoms onset today. Pt aox1 at baseline.

## 2018-12-31 NOTE — ED PROVIDER NOTES
Patient Seen in: [facility] AND [facility] Emergency Department    History   Patient presents with:  Altered Mental Status (neurologic)    Stated Complaint: fever/ams    HPI    80-year-old female with dementia at [facility] here with reported altered mental stat very rare    Drug use: No      Review of Systems    Positive for stated complaint: fever/ams  Other systems are as noted in HPI. Constitutional and vital signs reviewed. All other systems reviewed and negative except as noted above.     Physical Exam -American 61 (*)     All other components within normal limits   HEPATIC FUNCTION PANEL (7) - Abnormal; Notable for the following components:     Total Protein 5.8 (*)     Albumin 2.2 (*)     All other components within normal limits   URINALYSIS WIT XR CHEST AP PORTABLE (CPT=71045), 4/24/2018, 7:02. INDICATIONS: Fever, altered mental status. Vomiting today. Prior history of pneumonia. TECHNIQUE:   Single view. FINDINGS:  CARDIAC/MEDIASTINUM: The cardiac silhouette is enlarged and unchanged.  There care with a primary care provider within the next three months to obtain basic health screening including reassessment of your blood pressure.     Medications Prescribed:  Current Discharge Medication List        Present on Admission  Date Reviewed: 11/23/2

## 2019-01-01 ENCOUNTER — APPOINTMENT (OUTPATIENT)
Dept: CT IMAGING | Facility: HOSPITAL | Age: 84
DRG: 871 | End: 2019-01-01
Attending: INTERNAL MEDICINE
Payer: MEDICARE

## 2019-01-01 ENCOUNTER — APPOINTMENT (OUTPATIENT)
Dept: ULTRASOUND IMAGING | Facility: HOSPITAL | Age: 84
DRG: 871 | End: 2019-01-01
Attending: INTERNAL MEDICINE
Payer: MEDICARE

## 2019-01-01 VITALS
OXYGEN SATURATION: 95 % | HEART RATE: 92 BPM | HEIGHT: 61 IN | SYSTOLIC BLOOD PRESSURE: 156 MMHG | TEMPERATURE: 99 F | RESPIRATION RATE: 18 BRPM | WEIGHT: 163.31 LBS | DIASTOLIC BLOOD PRESSURE: 84 MMHG | BODY MASS INDEX: 30.83 KG/M2

## 2019-01-01 LAB
ADENOVIRUS F 40/41 PCR: NEGATIVE
ANION GAP SERPL CALC-SCNC: 6 MMOL/L (ref 0–18)
ANION GAP SERPL CALC-SCNC: 8 MMOL/L (ref 0–18)
ANION GAP SERPL CALC-SCNC: 9 MMOL/L (ref 0–18)
ANION GAP SERPL CALC-SCNC: 9 MMOL/L (ref 0–18)
ANTIBODY SCREEN: NEGATIVE
APTT PPP: 187.2 SECONDS (ref 23.2–35.3)
APTT PPP: 42.3 SECONDS (ref 23.2–35.3)
ASTROVIRUS PCR: NEGATIVE
BASOPHILS # BLD: 0 K/UL (ref 0–0.2)
BASOPHILS # BLD: 0 K/UL (ref 0–0.2)
BASOPHILS # BLD: 0.1 K/UL (ref 0–0.2)
BASOPHILS NFR BLD: 0 %
BUN SERPL-MCNC: 10 MG/DL (ref 8–20)
BUN SERPL-MCNC: 13 MG/DL (ref 8–20)
BUN SERPL-MCNC: 18 MG/DL (ref 8–20)
BUN SERPL-MCNC: 21 MG/DL (ref 8–20)
BUN/CREAT SERPL: 14.1 (ref 10–20)
BUN/CREAT SERPL: 16.3 (ref 10–20)
BUN/CREAT SERPL: 18.1 (ref 10–20)
BUN/CREAT SERPL: 20 (ref 10–20)
C CAYETANENSIS DNA SPEC QL NAA+PROBE: NEGATIVE
C DIFF TOX B STL QL: NEGATIVE
C. DIFFICILE TOXIN A/B PCR: NEGATIVE
CALCIUM SERPL-MCNC: 7.4 MG/DL (ref 8.5–10.5)
CALCIUM SERPL-MCNC: 7.8 MG/DL (ref 8.5–10.5)
CALCIUM SERPL-MCNC: 8.1 MG/DL (ref 8.5–10.5)
CALCIUM SERPL-MCNC: 8.1 MG/DL (ref 8.5–10.5)
CAMPY SP DNA.DIARRHEA STL QL NAA+PROBE: NEGATIVE
CHLORIDE SERPL-SCNC: 106 MMOL/L (ref 95–110)
CHLORIDE SERPL-SCNC: 108 MMOL/L (ref 95–110)
CHLORIDE SERPL-SCNC: 109 MMOL/L (ref 95–110)
CHLORIDE SERPL-SCNC: 112 MMOL/L (ref 95–110)
CO2 SERPL-SCNC: 19 MMOL/L (ref 22–32)
CO2 SERPL-SCNC: 21 MMOL/L (ref 22–32)
CO2 SERPL-SCNC: 23 MMOL/L (ref 22–32)
CO2 SERPL-SCNC: 23 MMOL/L (ref 22–32)
CREAT SERPL-MCNC: 0.71 MG/DL (ref 0.5–1.5)
CREAT SERPL-MCNC: 0.8 MG/DL (ref 0.5–1.5)
CREAT SERPL-MCNC: 0.9 MG/DL (ref 0.5–1.5)
CREAT SERPL-MCNC: 1.16 MG/DL (ref 0.5–1.5)
CRYPTOSP DNA SPEC QL NAA+PROBE: NEGATIVE
EAEC PAA PLAS AGGR+AATA ST NAA+NON-PRB: NEGATIVE
EC STX1+STX2 + H7 FLIC SPEC NAA+PROBE: NEGATIVE
ENTAMOEBA HISTOLYTICA PCR: NEGATIVE
EOSINOPHIL # BLD: 0 K/UL (ref 0–0.7)
EOSINOPHIL NFR BLD: 0 %
EPEC EAE GENE STL QL NAA+NON-PROBE: NEGATIVE
ERYTHROCYTE [DISTWIDTH] IN BLOOD BY AUTOMATED COUNT: 18.3 % (ref 11–15)
ERYTHROCYTE [DISTWIDTH] IN BLOOD BY AUTOMATED COUNT: 18.4 % (ref 11–15)
ERYTHROCYTE [DISTWIDTH] IN BLOOD BY AUTOMATED COUNT: 18.5 % (ref 11–15)
ERYTHROCYTE [DISTWIDTH] IN BLOOD BY AUTOMATED COUNT: 18.5 % (ref 11–15)
ERYTHROCYTE [DISTWIDTH] IN BLOOD BY AUTOMATED COUNT: 18.8 % (ref 11–15)
ETEC LTA+ST1A+ST1B TOX ST NAA+NON-PROBE: NEGATIVE
GIARDIA LAMBLIA PCR: NEGATIVE
GLUCOSE SERPL-MCNC: 109 MG/DL (ref 70–99)
GLUCOSE SERPL-MCNC: 131 MG/DL (ref 70–99)
GLUCOSE SERPL-MCNC: 140 MG/DL (ref 70–99)
GLUCOSE SERPL-MCNC: 150 MG/DL (ref 70–99)
HCT VFR BLD AUTO: 18.3 % (ref 35–48)
HCT VFR BLD AUTO: 20 % (ref 35–48)
HCT VFR BLD AUTO: 20.4 % (ref 35–48)
HCT VFR BLD AUTO: 20.5 % (ref 35–48)
HCT VFR BLD AUTO: 21.7 % (ref 35–48)
HCT VFR BLD AUTO: 23.3 % (ref 35–48)
HEMOCCULT STL QL: NEGATIVE
HGB BLD-MCNC: 6 G/DL (ref 12–16)
HGB BLD-MCNC: 6.4 G/DL (ref 12–16)
HGB BLD-MCNC: 6.5 G/DL (ref 12–16)
HGB BLD-MCNC: 6.5 G/DL (ref 12–16)
HGB BLD-MCNC: 6.9 G/DL (ref 12–16)
HGB BLD-MCNC: 7.3 G/DL (ref 12–16)
LYMPHOCYTES # BLD: 0.6 K/UL (ref 1–4)
LYMPHOCYTES # BLD: 0.9 K/UL (ref 1–4)
LYMPHOCYTES # BLD: 1 K/UL (ref 1–4)
LYMPHOCYTES NFR BLD: 4 %
MCH RBC QN AUTO: 26.3 PG (ref 27–32)
MCH RBC QN AUTO: 26.5 PG (ref 27–32)
MCH RBC QN AUTO: 26.9 PG (ref 27–32)
MCH RBC QN AUTO: 27 PG (ref 27–32)
MCH RBC QN AUTO: 27.3 PG (ref 27–32)
MCHC RBC AUTO-ENTMCNC: 31.4 G/DL (ref 32–37)
MCHC RBC AUTO-ENTMCNC: 31.6 G/DL (ref 32–37)
MCHC RBC AUTO-ENTMCNC: 31.9 G/DL (ref 32–37)
MCHC RBC AUTO-ENTMCNC: 32.4 G/DL (ref 32–37)
MCHC RBC AUTO-ENTMCNC: 32.8 G/DL (ref 32–37)
MCV RBC AUTO: 83.2 FL (ref 80–100)
MCV RBC AUTO: 83.4 FL (ref 80–100)
MCV RBC AUTO: 83.8 FL (ref 80–100)
MCV RBC AUTO: 83.8 FL (ref 80–100)
MCV RBC AUTO: 84.2 FL (ref 80–100)
METAMYELOCYTES # BLD MANUAL: 0.3 K/UL
METAMYELOCYTES # BLD MANUAL: 0.44 K/UL
METAMYELOCYTES NFR BLD: 2 %
MONOCYTES # BLD: 0.3 K/UL (ref 0–1)
MONOCYTES # BLD: 0.5 K/UL (ref 0–1)
MONOCYTES # BLD: 0.9 K/UL (ref 0–1)
MONOCYTES NFR BLD: 2 %
MONOCYTES NFR BLD: 2 %
MONOCYTES NFR BLD: 4 %
MYELOCYTES NFR BLD: 3 %
NEUTROPHILS # BLD AUTO: 13.2 K/UL (ref 1.8–7.7)
NEUTROPHILS # BLD AUTO: 23.8 K/UL (ref 1.8–7.7)
NEUTROPHILS # BLD AUTO: 24.2 K/UL (ref 1.8–7.7)
NEUTROPHILS NFR BLD: 87 %
NEUTROPHILS NFR BLD: 93 %
NEUTROPHILS NFR BLD: 94 %
NEUTS BAND NFR BLD: 2 %
NOROVIRUS GI/GII PCR: NEGATIVE
OSMOLALITY UR CALC.SUM OF ELEC: 282 MOSM/KG (ref 275–295)
OSMOLALITY UR CALC.SUM OF ELEC: 290 MOSM/KG (ref 275–295)
OSMOLALITY UR CALC.SUM OF ELEC: 291 MOSM/KG (ref 275–295)
OSMOLALITY UR CALC.SUM OF ELEC: 295 MOSM/KG (ref 275–295)
P SHIGELLOIDES DNA STL QL NAA+PROBE: NEGATIVE
PLATELET # BLD AUTO: 161 K/UL (ref 140–400)
PLATELET # BLD AUTO: 161 K/UL (ref 140–400)
PLATELET # BLD AUTO: 165 K/UL (ref 140–400)
PLATELET # BLD AUTO: 180 K/UL (ref 140–400)
PLATELET # BLD AUTO: 180 K/UL (ref 140–400)
PLATELET # BLD AUTO: 184 K/UL (ref 140–400)
PLATELET # BLD AUTO: 214 K/UL (ref 140–400)
PMV BLD AUTO: 7.5 FL (ref 7.4–10.3)
PMV BLD AUTO: 7.7 FL (ref 7.4–10.3)
PMV BLD AUTO: 7.8 FL (ref 7.4–10.3)
PMV BLD AUTO: 7.8 FL (ref 7.4–10.3)
PMV BLD AUTO: 8.1 FL (ref 7.4–10.3)
POTASSIUM SERPL-SCNC: 3.2 MMOL/L (ref 3.3–5.1)
POTASSIUM SERPL-SCNC: 3.4 MMOL/L (ref 3.3–5.1)
POTASSIUM SERPL-SCNC: 3.5 MMOL/L (ref 3.3–5.1)
POTASSIUM SERPL-SCNC: 4.3 MMOL/L (ref 3.3–5.1)
PROCALCITONIN SERPL-MCNC: 3.48 NG/ML (ref ?–0.11)
RBC # BLD AUTO: 2.19 M/UL (ref 3.7–5.4)
RBC # BLD AUTO: 2.4 M/UL (ref 3.7–5.4)
RBC # BLD AUTO: 2.44 M/UL (ref 3.7–5.4)
RBC # BLD AUTO: 2.57 M/UL (ref 3.7–5.4)
RBC # BLD AUTO: 2.78 M/UL (ref 3.7–5.4)
RH BLOOD TYPE: NEGATIVE
ROTAVIRUS A PCR: NEGATIVE
SALMONELLA DNA SPEC QL NAA+PROBE: NEGATIVE
SAPOVIRUS PCR: NEGATIVE
SHIGELLA SP+EIEC IPAH ST NAA+NON-PROBE: NEGATIVE
SODIUM SERPL-SCNC: 136 MMOL/L (ref 136–144)
SODIUM SERPL-SCNC: 138 MMOL/L (ref 136–144)
SODIUM SERPL-SCNC: 139 MMOL/L (ref 136–144)
SODIUM SERPL-SCNC: 140 MMOL/L (ref 136–144)
V CHOLERAE DNA SPEC QL NAA+PROBE: NEGATIVE
VANCOMYCIN TROUGH SERPL-MCNC: 14.4 MCG/ML (ref 10–20)
VIBRIO DNA SPEC NAA+PROBE: NEGATIVE
WBC # BLD AUTO: 14.8 K/UL (ref 4–11)
WBC # BLD AUTO: 25.4 K/UL (ref 4–11)
WBC # BLD AUTO: 26.1 K/UL (ref 4–11)
WBC # BLD AUTO: 9.4 K/UL (ref 4–11)
WBC # BLD AUTO: 9.7 K/UL (ref 4–11)
YERSINIA DNA SPEC NAA+PROBE: NEGATIVE

## 2019-01-01 PROCEDURE — 99233 SBSQ HOSP IP/OBS HIGH 50: CPT | Performed by: INTERNAL MEDICINE

## 2019-01-01 PROCEDURE — 71260 CT THORAX DX C+: CPT | Performed by: INTERNAL MEDICINE

## 2019-01-01 PROCEDURE — 3E033GC INTRODUCTION OF OTHER THERAPEUTIC SUBSTANCE INTO PERIPHERAL VEIN, PERCUTANEOUS APPROACH: ICD-10-PCS | Performed by: INTERNAL MEDICINE

## 2019-01-01 PROCEDURE — 93970 EXTREMITY STUDY: CPT | Performed by: INTERNAL MEDICINE

## 2019-01-01 PROCEDURE — 99238 HOSP IP/OBS DSCHRG MGMT 30/<: CPT | Performed by: INTERNAL MEDICINE

## 2019-01-01 PROCEDURE — 74177 CT ABD & PELVIS W/CONTRAST: CPT | Performed by: INTERNAL MEDICINE

## 2019-01-01 PROCEDURE — 99231 SBSQ HOSP IP/OBS SF/LOW 25: CPT | Performed by: NURSE PRACTITIONER

## 2019-01-01 PROCEDURE — 99223 1ST HOSP IP/OBS HIGH 75: CPT | Performed by: NURSE PRACTITIONER

## 2019-01-01 RX ORDER — 0.9 % SODIUM CHLORIDE 0.9 %
VIAL (ML) INJECTION
Status: COMPLETED
Start: 2019-01-01 | End: 2019-01-01

## 2019-01-01 RX ORDER — HEPARIN SODIUM AND DEXTROSE 10000; 5 [USP'U]/100ML; G/100ML
INJECTION INTRAVENOUS CONTINUOUS
Status: DISCONTINUED | OUTPATIENT
Start: 2019-01-01 | End: 2019-01-01

## 2019-01-01 RX ORDER — SODIUM CHLORIDE 9 MG/ML
INJECTION, SOLUTION INTRAVENOUS
Status: COMPLETED
Start: 2019-01-01 | End: 2019-01-01

## 2019-01-01 RX ORDER — HEPARIN SODIUM 1000 [USP'U]/ML
80 INJECTION, SOLUTION INTRAVENOUS; SUBCUTANEOUS ONCE
Status: COMPLETED | OUTPATIENT
Start: 2019-01-01 | End: 2019-01-01

## 2019-01-01 RX ORDER — HEPARIN SODIUM AND DEXTROSE 10000; 5 [USP'U]/100ML; G/100ML
18 INJECTION INTRAVENOUS ONCE
Status: COMPLETED | OUTPATIENT
Start: 2019-01-01 | End: 2019-01-01

## 2019-01-01 RX ORDER — HEPARIN SODIUM 1000 [USP'U]/ML
INJECTION, SOLUTION INTRAVENOUS; SUBCUTANEOUS
Status: DISPENSED
Start: 2019-01-01 | End: 2019-01-01

## 2019-01-01 NOTE — PROGRESS NOTES
[facility] Infectious Disease Progress Note    Slava Tobias Patient Status:  Inpatient    1926 MRN [id number]   Location [facility] 3W/SW Attending Justin Soto MD   Hosp Day # 1 PCP Jerald Christian MD     Subjective:  Pt Assessment/Plan:    1.   Sepsis   -pt presented with emesis, fevers, leukocytosis and elevated PCT  -fever curve improving  -viral vs other  -[facility] with recent viral outbreak, including norovirus  -C diff negative  -stool panel pending  -RVP PCR negative

## 2019-01-01 NOTE — PROGRESS NOTES
[facility] dosing service    Initial Pharmacokinetic Consult for Vancomycin Dosing     Jayjay Dumont is a 80year old female admitted on 12/31 who is being treated for sepsis. Pharmacy has been asked to dose Vancomycin by Dr. Benito Flores.     She i prior to 4th dose. Goal trough level 15-20 ug/mL. 3.  Pharmacy will need BUN/Scr daily while on Vancomycin to assess renal function. 4.  Pharmacy will follow and monitor renal function changes, toxicity and efficacy.     Pharmacy will continue to fo

## 2019-01-02 PROBLEM — Z71.89 GOALS OF CARE, COUNSELING/DISCUSSION: Status: ACTIVE | Noted: 2019-01-01

## 2019-01-02 PROBLEM — Z71.89 ADVANCE CARE PLANNING: Status: ACTIVE | Noted: 2019-01-01

## 2019-01-02 NOTE — WOUND PROGRESS NOTE
Pt seen for right hip wound vac placement. Son Nick Jimenez is at bedside to hold patient's hand and keep her calm as she becomes agitated at times, suffers from dementia.  The right hip wound had skin prep applied to the pal wound, 1 pc black vac sponge placed in

## 2019-01-02 NOTE — HOSPICE RN NOTE
[facility] will meet with son on 1/3/19 at 930am to discuss POC and goals for this patient.

## 2019-01-02 NOTE — CONSULTS
[address] [facility] Patient Status:  Inpatient    1926 MRN [id number]   Location [facility] 3W/SW Attending Ayaz Mckeon MD   Hosp Day # 2 PCP Janeth Tony MD     Date of Cons bedside. Patient appears overall comfortable on room air with no non-verbal signs of pain or dyspnea observed. I asked her how she is feeling today and she looked at me and said, \"I'm fine. How are you today? \" She nodded her head no to pain.  She did not GOC/wishes discussions, and assistance with advance care planning needs. I discussed the differences between palliative care and hospice. I provided education about the Medicare hospice benefit and philosophy. Palliative care brochure provided.      I disc 1997    \"Cuo Disc Asym\", OU   • Dementia    • ERM OS (epiretinal membrane, left eye) 2013    OS   • ERM OS (epiretinal membrane, left eye) 1997, 2013    with pseudohole   • Floaters 2008, 2013    OU   • Glaucoma suspect 1997     C/D asym OU   • History o sodium chloride 0.9 % 250 mL IVPB add-vantage, 15 mg/kg, Intravenous, Q24H  •  dextrose 5 % and 0.9 % NaCl infusion, , Intravenous, Continuous  •  acetaminophen (TYLENOL) 650 MG rectal suppository 650 mg, 650 mg, Rectal, Q6H PRN    No current outpatient me bursa.  Correlate for drainage. There is nonspecific fat stranding in the anterior compartment of the right thigh which may represent reactive inflammation given the adjacent wound. However, correlate for right lower extremity DVT. 2.  Renal cysts.  3.  C of pain: PAIN AD 0; pt denies pain    Physical Exam:  General: chronically ill, comfortable, in no apparent distress  Nutritional status: thin  HEENT: dry mucous membranes  Chest appearance: Kyphosis  Cardiac: deferred  Lungs: Normal excursions and effort. Advance care planning  -Pt is already DNR/DNI - valid POLST form can be found in [address] paperwork can be found in [facility] - according to family #1 Gwen Rogers is son Khushbu Peña with Ceasar Dunn named as a successor agent  -See above narrative for further details    [place]

## 2019-01-02 NOTE — WOUND PROGRESS NOTE
Dr. Marlon Ann phoned back and wound recommendations discussed.  Pt was receiving wound vac therapy to the right hip wound and order was obtained to continue wound vac while in the hospital. Santyl will be ordered for the unstageable coccyx wound that has slou

## 2019-01-02 NOTE — CM/SW NOTE
MAIRA received an MDO regarding hospice. MAIRA [facility] [id number] [email or web address] who stated that she will send the order via [facility]. Social work/case management to remain available for support and/or discharge planning.     Abraham Arias, LSW  X10

## 2019-01-02 NOTE — PLAN OF CARE
Problem: Patient Centered Care  Goal: Patient preferences are identified and integrated in the patient's plan of care  Interventions:  - What would you like us to know as we care for you?  Please include son, Kaci Santillans in plan of care  - Provide timely, complete notified.   Goal: Absence of cardiac arrhythmias or at baseline  INTERVENTIONS:  - Continuous cardiac monitoring, monitor vital signs, obtain 12 lead EKG if indicated  - Evaluate effectiveness of antiarrhythmic and heart rate control medications as ordered liquids at a slow rate  - No straws  - Encourage small bites of food and small sips of liquid  - Offer pills one at a time, crush or deliver with applesauce as needed  - Discontinue feeding and notify MD (or speech pathologist) if coughing or persistent th

## 2019-01-02 NOTE — CM/SW NOTE
From [facility], updates sent 1/2.   Rose Marie Vidal, [address] [place] [address]

## 2019-01-02 NOTE — CM/SW NOTE
Explanation of of BPCI/Medicare program provided. Patient was enrolled under . BPCI/Medicare letter and brochure provided.   Frank Marin RN, CTL

## 2019-01-02 NOTE — PLAN OF CARE
Discussed patient status/plan of care. Physician recommendation for blood transfusion, IVC filter placement. Son/POA Cal Bo undecided on progression of plan. Wishes to discuss with physician. Dr. Dumont Irene notified.

## 2019-01-02 NOTE — CONSULTS
[facility] Gastroenterology Initial Consult Note     Steffi Fee  [id number]  1/5/1926    No ref.  provider found    Chief Complaint and History of Present Illness:     Patient presents with:  Altered Mental Status (neurologi Left 11/18/15    [facility]        Current Facility-Administered Medications:  heparin (PORCINE) drip 46129qsued/250mL infusion CONTINUOUS 200-3,000 Units/hr Intravenous Continuous   Sodium Chloride 0.9 % solution      heparin sodium 1000 UNIT/ML injection      S 06:44 AM    K 3.5 01/02/2019 06:44 AM     01/02/2019 06:44 AM    CO2 23 01/02/2019 06:44 AM    BUN 18 01/02/2019 06:44 AM    CREATSERUM 0.90 01/02/2019 06:44 AM     (H) 01/02/2019 06:44 AM    ANIONGAP 8 01/02/2019 06:44 AM      Lab Results   C compression. CONCLUSION:  1. Nonocclusive thrombus involving the right popliteal vein. This appears to extend to the right peroneal vein, which is not well visualized. 2. No additional definitive thrombus is identified.     A preliminary report wa bowel obstruction. The terminal ileum and appendix (series 18, image 26) are within normal limits. There is scattered left and right-sided colonic diverticulosis. There is no free air, free fluid, or lymphadenopathy in the abdomen or pelvis.   Large focus o bursa.  There is fluid at the base of this wound which may represent trochanteric bursitis or superimposed infection in or near the bursa. Correlate for drainage.   There is nonspecific fat stranding in the anterior compartment of the right thigh which may technique for dose reduction was employed. Multiplanar reformats and maximum intensity projection images were created.    FINDINGS: COMMENT: Overall examination quality is degraded by beam hardening artifact throughout the imaged volume secondary to the pat are also apparent. OTHER: A small hiatal hernia is evident. CONCLUSION:  1. No evidence of acute pulmonary embolism to the level of the first order subsegmental pulmonary artery branches. 2. Mild, diffuse apparent pulmonary interstitial edema.   3

## 2019-01-02 NOTE — PROGRESS NOTES
[facility]    Progress Note    Zeke Vazquez Patient Status:  Inpatient    1926 MRN [id number]   Location [facility] 3W/SW Attending Lyric Zuniga MD   Hosp Day # 2 PCP Kirill Chisholm MD        Subjective:     Sherryle Fret today able to tolerate p.o. and then will do swallow eval FIRST.         Patient is DNR. SCDs due to anemia.     POOR PROGNOSIS/DW PTS SON LISE REGARDING PALLIATIVE CARE/FAMILY TO DECIDE/NO TRANFUSION OR IVC FILTER FOR NOW, RISKS DISCUSSED          Results drainage. There is nonspecific fat stranding in the anterior compartment of the right thigh which may represent reactive inflammation given the adjacent wound. However, correlate for right lower extremity DVT. 2.  Renal cysts. 3.  Colonic diverticulosis. illness, I anticipate that, after discharge, patient will require TBD.       Rehan Mir MD  1/2/2019

## 2019-01-02 NOTE — PROGRESS NOTES
[facility] AND [facility] Infectious Disease  Progress Note    Arther Lombard Patient Status:  Inpatient    1926 MRN [id number]   Location [facility] 3W/SW Attending Servando Aguero MD   Hosp Day # 2 PCP Alicia Buckley MD     Subjective:  Pa 8.28  - IV meropenem and vancomycin day #2 ongoing     2. Leukocytosis due to the above  - At 12K on admission, now with spike to 25K     3.   S/p deep debridement of a complicated, infected R hip wound  - Cultures historically with klebsiella and enteroco

## 2019-01-02 NOTE — DIETARY NOTE
ADULT NUTRITION INITIAL ASSESSMENT    Pt is at moderate nutrition risk. Pt does not meet malnutrition criteria.       RECOMMENDATIONS TO MD:  None at this time     NUTRITION DIAGNOSIS/PROBLEM:  Increased nutrient needs related to increased demand for heali • Unspecified essential hypertension      ANTHROPOMETRICS:  HT: 154.9 cm (5' 1\")  WT: 68 kg (150 lb)   BMI: Body mass index is 28.34 kg/m².   BMI CLASSIFICATION: 25-29.9 kg/m2 - overweight  IBW: 105 lbs        143% IBW  Usual Body Wt: 160 lbs per previou NEEDS:  Calories: 1700 calories/day (25 calories per kg Current wt)  Protein: 68 grams protein/day (1.0 grams protein per kg Current wt)    MONITOR AND EVALUATE/NUTRITION GOALS:  - Food and Nutrient Intake:      Monitor: for PO initiation and for PO diet a

## 2019-01-02 NOTE — WOUND PROGRESS NOTE
WOUND CARE NOTE      PLAN   Recommendations:  Dietary consult for recommendations for nutrition to optimize wound healing  VAC standing orders  VAC troubleshooting instructions on the machine  Staff to monitor VAC seal and repair seal if indicated.   Mery Rawls recs wound vac to right hip and Santyl to coccyx wound. Pt tolerated treatment well.      Allergies: Penicillins    Labs:   Lab Results   Component Value Date    WBC 25.4 (H) 01/02/2019    HGB 6.5 (LL) 01/02/2019    HCT 20.5 (L) 01/02/2019     01/02/

## 2019-01-03 NOTE — CONSULTS
Ralf Harper Patient Status:  Inpatient    1926 MRN [id number]   Location [facility] 3W/SW Attending Jerod Lopez MD   Hosp Day # 3 PCP Melisa Larry MD     Date of Consult: 0 consciousness-alert  Orientation: x 1  Appearance: appropriately groomed     Allergies:    Penicillins                 Comment:Other reaction(s): Unknown    Medications:     Current Facility-Administered Medications:   •  Sodium Chloride 0.9 % solution, , identified. A preliminary report was issued by the [address] [place] [address] Radiology teleradiology service. There are no major discrepancies.    Dictated by (CST): General Abhinav MD on 1/02/2019 at 5:55     Approved by (CST): General Abhinav MD on 1/02/2019 at 5:58 effusions and associated compressive atelectasis, with or without superimposed pneumonia. 4. Dilatation of the main pulmonary artery trunk may relate to underlying pulmonary hypertension. 5. Borderline cardiomegaly.   6. Lesser incidental findings as abov found in Charli - according to family #1 Jessica Fuller is son Sigifredo Fleeting with Sabas Brambila named as a successor agent  -See above narrative for further details     Palliative Performance Scale 10%     Palliative Care Follow-up:  I spent a total of 15 minutes with the patient eneida

## 2019-01-03 NOTE — PROGRESS NOTES
[facility] HOSP - [facility] Gastroenterology Progress Note    Corby Scott  [id number]  1/5/1926    No ref. provider found    24 hour events   -patient's family met with palliative care.  Decision was made for no IVC filter, no blood products UNIT/GM ointment  Topical BID   Pantoprazole Sodium (PROTONIX) 40 mg in Sodium Chloride 0.9 % 10 mL IV push 40 mg Intravenous Q12H   Meropenem (MERREM) 500 mg in sodium chloride 0.9% 100 mL  mg Intravenous Q12H   Vancomycin HCl (VANCOCIN) 1,000 mg i Value Date/Time    ALT 39 12/31/2018 01:41 PM    AST 34 12/31/2018 01:41 PM    LIP 24 12/31/2018 01:41 PM     Lab Results   Component Value Date/Time    .2 (H) 01/02/2019 06:44 AM    PTP 15.0 (H) 12/31/2018 02:01 PM    INR 1.2 12/31/2018 02:01 PM

## 2019-01-03 NOTE — CM/SW NOTE
CLAUDE received an MDO regarding community palliative services. Claude met with pt and her son Joshua Lee [phone] at bedside to discuss discharge planning. Pt is from [facility]. Pt's son states that he does not want her to return.  CLAUDE explained options

## 2019-01-03 NOTE — PROGRESS NOTES
120 [facility] dosing service    Follow-up Pharmacokinetic Consult for Vancomycin Dosing     Marizol Arndt is a 80year old female admitted on 12/31 who is being treated for sepsis. Patient is on day 4 of Vancomycin 1 gm IV Q 24 hours.   Goal trough is ug/mL.    3.  Pharmacy will need BUN/Scr daily while on Vancomycin to assess renal function. 4.  Pharmacy will follow and monitor renal function changes, toxicity and efficacy.     Jeferson Chan, PharmD  1/3/2019  12:55 PM  [facility]

## 2019-01-03 NOTE — PROGRESS NOTES
[facility]    Progress Note    Jayjay Dumont Patient Status:  Inpatient    1926 MRN [id number]   Location [facility] 3W/SW Attending Christopher Carrillo MD   Hosp Day # 3 PCP Florence Hampton MD        Subjective:     Sunni Duran hip pressure ulcer, stage 3 and sacral decub. Wound care/wound vac/IV abx        Advanced dementia. IV fluids for right now unless patient is more awake today able to tolerate p.o. and then will do swallow eval FIRST.         Patient is DNR.   SCDs due to is fluid at the base of this wound which may represent trochanteric bursitis or superimposed infection in or near the bursa. Correlate for drainage.   There is nonspecific fat stranding in the anterior compartment of the right thigh which may represent stef services that will reasonably be expected to span two midnight's based on the clinical documentation in H+P. Based on patients current state of illness, I anticipate that, after discharge, patient will require TBD.       Brooke Huerta MD  1/3/2019

## 2019-01-03 NOTE — WOUND PROGRESS NOTE
Pt seen for wound vac dressing check. Vac is running at 125 mmHg and scant amount of serous drainage is in cannister. Next dressing change is planned for tomorrow morning.  Son Johan Mediate in room and inquired about wound size and healing potential. I expressed th

## 2019-01-03 NOTE — SPIRITUAL CARE NOTE
Pt was in bed with family of 3 at bedside at the time of visit. Pt stated that she is concern about a pain in her butt. Pt doesn't want a prayer.  ML

## 2019-01-03 NOTE — HOSPICE RN NOTE
Hospice RNs Apollo Grijalva and Vaughn Rosa met with patient's son Everardo Durand and provided him with information in regards to hospice and care provided in the nursing home setting.   The patient's son is unable to make a decision at this time as he would like more informatio

## 2019-01-03 NOTE — PROGRESS NOTES
[facility] AND [facility] Infectious Disease Progress Note    [facility] Patient Status:  Inpatient    1926 MRN [id number]   Location [facility] 3W/SW Attending Estelita Velazquez MD   Hosp Day # 3 PCP Ana Li MD     Subjective:  Pt  01/03/2019    CO2 21 01/03/2019     01/03/2019    CA 8.1 01/03/2019     Assessment/Plan:    1.   Sepsis   -pt presented with emesis, fevers, leukocytosis and elevated PCT  -fever curve improving  -viral vs other  -[facility] with recent viral [place]

## 2019-01-04 NOTE — PROGRESS NOTES
[facility] AND [facility] Infectious Disease  Progress Note    Amanda Eng Patient Status:  Inpatient    1926 MRN [id number]   Location [facility] [place] 3W/SW Attending Rishi Montanez MD   Hosp Day # 4 PCP Kimberli Lewis MD     Subjective:  Pa historically with klebsiella and enterococcus  - Completed an IV tygacil course 12/9  - Local wound care/VAC ongoing     4.  Advanced dementia with severe PCM  - Needs nutritional support     5.   Acute RLE DVT  - No CT evidence of PE  - Anticoagulation vicente

## 2019-01-04 NOTE — WOUND PROGRESS NOTE
Pt seen for right hip wound vac dressing change. Son Dilcia Manley is at bedside to hold patient's hand and keep her calm as she becomes agitated at times, suffers from dementia.  The right hip wound had skin prep applied to the pal wound, 1 pc black vac sponge len

## 2019-01-04 NOTE — HOSPICE RN NOTE
[facility] spoke with son Nato Marie in the room and asked when his brother would be in today. Nato Marie not sure when his brother is coming today. Called [facility]. No answer message left to call us back today regarding discharge plans.   Spoke with Ramakrishna Cisneros

## 2019-01-04 NOTE — CONSULTS
According to the notes, plan is for hospice through [facility]. Discussed with Dr. Lea Messer and Agustin Denise from [facility].     Will sign off. If needed again, please call [phone].     Thank you for allowing the Palliative Care Se

## 2019-01-04 NOTE — CM/SW NOTE
Addendum 4:12pm    Claude [id number] pt is expected to be discharged to [facility] tomorrow Saturday 01/05/2019 at 10:30am. CLAUDE spoke with Justina Curry the [facility] [phone] who confirmed that they are able to a

## 2019-01-04 NOTE — SLP NOTE
SLP attempt for BSSE as pt with current \"pleasure feeds\". SLP thoroughly reviewed the purpose of the swallow evaluation to determine the safest and least restrictive diet consistencies for pleasure feeds.   Pt's son in the room and declines swallow evalua

## 2019-01-04 NOTE — HOSPICE RN NOTE
Hospice RN talked with son Camille Key, who is in agreement with hospice care to begin tomorrow (1/5) at [facility].  He is ok with wound vac being discontinued before admit to hospice.  Writer contacted [facility] and spoke with zach

## 2019-01-04 NOTE — PROGRESS NOTES
[facility]    Progress Note    Honorio Akhtar Patient Status:  Inpatient    1926 MRN [id number]   Location [facility] 3W/SW Attending Kavitha Nguyen MD   Hosp Day # 4 PCP Baker Dance, MD        Subjective:     Marianna Paula right hip wound healing w/ low Hgb/family to now consider transfusion        Tachycardia. Improved with IV fluids.  Sinus.  Due to above.        Right hip pressure ulcer, stage 3 and sacral decub. Wound care/wound vac/IV abx        Advanced dementia.   Sw

## 2019-01-04 NOTE — PLAN OF CARE
CARDIOVASCULAR - ADULT    • Maintains optimal cardiac output and hemodynamic stability Progressing    • Absence of cardiac arrhythmias or at baseline Progressing        Impaired Cognition    • Patient will exhibit improved attention, thought processing and Spoke with Dr. Elsie Mojica - notified that patient is scheduled to be discharged at 10:30am tomorrow. Stated he will be here around 9 to do discharge paperwork.

## 2019-01-05 PROBLEM — R50.9 FEVER: Status: ACTIVE | Noted: 2018-01-01

## 2019-01-05 PROBLEM — E86.0 DEHYDRATION: Status: RESOLVED | Noted: 2018-01-01 | Resolved: 2019-01-01

## 2019-01-05 PROBLEM — R50.9 FEVER, UNSPECIFIED FEVER CAUSE: Status: RESOLVED | Noted: 2018-01-01 | Resolved: 2019-01-01

## 2019-01-05 NOTE — DISCHARGE SUMMARY
[address] [place] [address] Meredith Patient Status:  Inpatient    1926 MRN [id number]   Location [facility] 3W/SW Attending Lyric Zuniga MD   Hosp Day # 5 PCP Kirill Chisholm MD     Date of Admission: 2018  Date of Discharge:  Divalproex Sodium 125 MG Csdr  Commonly known as:  DEPAKOTE SPRINKLE        ferrous sulfate 325 (65 FE) MG Tbec        Garlic 455 MG Tabs        Heparin Sodium (Porcine) 5000 UNIT/ML Soln        HYDROcodone-acetaminophen 5-325 MG Tabs  Commonly known

## 2019-01-05 NOTE — PLAN OF CARE
CARDIOVASCULAR - ADULT    • Maintains optimal cardiac output and hemodynamic stability Adequate for Discharge    • Absence of cardiac arrhythmias or at baseline Adequate for Discharge        Impaired Cognition    • Patient will exhibit improved attention,

## (undated) DEVICE — CONTAINER SPEC STR 4OZ GRY LID

## (undated) DEVICE — MEDI-VAC NON-CONDUCTIVE SUCTION TUBING: Brand: CARDINAL HEALTH

## (undated) DEVICE — SOL  .9 1000ML BTL

## (undated) DEVICE — DRAPE SHEET LAPAROTOMY

## (undated) DEVICE — ENCORE® LATEX ACCLAIM SIZE 8, STERILE LATEX POWDER-FREE SURGICAL GLOVE: Brand: ENCORE

## (undated) DEVICE — GAUZE SPONGES,12 PLY: Brand: CURITY

## (undated) DEVICE — TRAY SRGPRP PVP IOD WT SCRB SM

## (undated) DEVICE — MINOR GENERAL: Brand: MEDLINE INDUSTRIES, INC.

## (undated) DEVICE — GAMMEX® PI HYBRID SIZE 6.5, STERILE POWDER-FREE SURGICAL GLOVE, POLYISOPRENE AND NEOPRENE BLEND: Brand: GAMMEX

## (undated) NOTE — ED AVS SNAPSHOT
Zahida Kay   MRN: [id number]    Department:  [facility] Emergency Department   Date of Visit:  11/6/2017           Disclosure     Insurance plans vary and the physician(s) referred by the ER may not be covered by your plan.  Please conta CARE PHYSICIAN AT ONCE OR RETURN IMMEDIATELY TO THE EMERGENCY DEPARTMENT. If you have been prescribed any medication(s), please fill your prescription right away and begin taking the medication(s) as directed.   If you believe that any of the medications

## (undated) NOTE — IP AVS SNAPSHOT
Patient Demographics     Address  [place] [address] Phone  [phone] [facility])  [phone] (Mobile) *Preferred* E-mail Address  [email or web address]      Emergency Contact(s)     Name Relation Home Work [facility] Most Recent Value   Vitals  156/84 Filed at 01/05/2019 0900   Pulse  92 Filed at 01/05/2019 0455   Resp  18 Filed at 01/05/2019 0900   Temp  98.9 °F (37.2 °C) Filed at 01/05/2019 0900   SpO2  95 % Filed at 01/05/2019 0900      Patient's Most Recent [facility] Calculated Osmolality 282 275 - 295 mOsm/kg — [facility]   GFR, Non-African American >60 >=60 — [facility]   GFR, African-American >60 >=60 [employer]   Comment:           Estimated GFR units: mL/min/1.73 square meters   eGFR calculated by the CKD- Plesiomonas Shigelloides Pcr Negative     Salmonella Pcr Negative     Vibrio Pcr Negative     Vibrio Cholera Pcr Negative     Yersinia Entercolitica Pcr Negative     Enteroaggregative E. Coli Pcr Negative     Enteropathogenic E.  Coli Pcr Negative     Ent Parainfluenza 1 PCR: Negative     Parainlfuenza 2 PCR Negative     Parainlfuenza 3 PCR Negative     Parainlfuenza 4 PCR Negative     Resp Syncytial Virus PCR Negative     Bordetella Pertussis PCR Negative     Chlamydia pneumonia PCR: Negative     [employer] • Other and unspecified hyperlipidemia    • PVD (posterior vitreous detachment), left eye     OS   • Unspecified essential hypertension      Past Surgical History:   Procedure Laterality Date   •      • CATARACT EXTRACTION W/  INTRAOCULAR LENS DilTIAZem HCl ER Coated Beads 120 MG Oral Capsule SR 24 Hr Take 1 capsule (120 mg total) by mouth daily.    ferrous sulfate 325 (65 FE) MG Oral Tab EC Take 1 tablet (325 mg total) by mouth daily with breakfast.   Pantoprazole Sodium 40 MG Oral Tab EC Take 1 mass[MC.3] and[MC.1] no thyromegaly[MC.3] present. Cardiovascular:[MC.1] Regular rhythm[MC.3] and[MC.1] intact distal pulses[MC.3]. [MC.1]  Tachycardia[MC.3] present. [MC.1]     Edema[MC.3] not present.   Pulmonary/Chest:[MC.1] Effort normal[MC.3] and[MC.1] Lab Results   Component Value Date    WBC 12.1 (H) 12/31/2018    HGB 7.8 (L) 12/31/2018    HCT 23.9 (L) 12/31/2018     12/31/2018    CREATSERUM 0.96 12/31/2018    BUN 23 (H) 12/31/2018     12/31/2018    K 4.0 12/31/2018     12/31/2018 Wound care. Advanced dementia. IV fluids for right now unless patient is more awake today able to tolerate p.o. and then will do swallow eval FIRST. Patient is DNR. SCDs due to anemia. [MC.3]    Celina Mcarthur MD  12/31/2018[MC.1]    Electr • ERM OS (epiretinal membrane, left eye) 2013    OS   • ERM OS (epiretinal membrane, left eye) 1997, 2013    with pseudohole   • Floaters 2008, 2013    OU   • Glaucoma suspect 1997     C/D asym OU   • History of Papanicolaou smear of cervix 7/31/2009   • O Problem Relation Age of Onset   • Cancer Father    • Other (Other) Father    • Hypertension Mother    • Glaucoma Brother    • Cancer Brother    • Diabetes Neg    • Macular degeneration Neg         REVIEW OF SYSTEMS:[AH.1]   Unable to obtain due to dementia PROCEDURE: US VENOUS DOPPLER LEG BILAT-DIAG IMG (CPT=93970)  COMPARISON: [address] [facility], 1/22/2010, 14:51. INDICATIONS: Bilateral lower leg swelling/edema.   TECHNIQUE: Color duplex Doppler venous ultrasound of both lower e PELVIS (CPT=74176), 4/24/2018, 8:27. INDICATIONS: Fever. TECHNIQUE: CT images of the abdomen and pelvis were obtained with non-ionic intravenous contrast material.  Automated exposure control for dose reduction was used.  Adjustment of the mA and/or kV wa right thigh. Nonspecific fatty change within the paraspinal and gluteal muscles. URINARY BLADDER: Bladder is collapsed around a Lange catheter. Minimal gas in urine within the bladder lumen likely related to Lange catheter manipulation.  PELVIC NODES: No ovaries have cysts measuring 11 mm on the right and 22 mm on the left. A pessary is present. 5.  A Lange catheter is present in the bladder lumen. 6.  Large focus of fecal material within the rectum. No evidence of bowel obstruction.   Correlate for fecal main pulmonary artery trunk is borderline dilated in caliber, measuring 3.0 cm. CARDIAC: The heart is borderline enlarged. There is no bowing of the interventricular septum to suggest right ventricular strain.  Atherosclerotic vascular calcifications are pr Epifanio Walters MD on 1/02/2019 at 9:34               ASSESSMENT AND PLAN:   81yo F with dementia who presents with altered mental status and[AH.1] found to have sepsis with high fevers with unclear source, DVT and started on heparin gtt yesterday, now wit Filed:  1/4/2019  4:59 PM Date of Service:  1/4/2019  4:55 PM Status:  Signed    :  Lee Pierre, SLP (Speech and Language Pathologist)       SLP attempt for BSSE as pt with current \"pleasure feeds\".  SLP thoroughly reviewed the purpose of the

## (undated) NOTE — MR AVS SNAPSHOT
[place]  [address]  [phone]               Thank you for choosing us for your health care visit with Robinson Haque MD.  We are glad to serve you and happy to provide you with this summary of Visits < Visit Summaries. MyChart questions? Call [phone] for help. [employer] is NOT to be used for urgent needs. For medical emergencies, dial 911.            Visit [facility] online at  [email or web address]

## (undated) NOTE — IP AVS SNAPSHOT
[facility]            (For Outpatient Use Only) Initial Admit Date: 11/16/2018   Inpt/Obs Admit Date: Inpt: 11/16/18 / Obs: N/A   Discharge Date:    Joseline King:  [de-identified]   MRN: [de-identified]   CSN: [id number]        ENCOUNTER  Patient Cla Subscriber Name:  Pernell Redman :    Subscriber ID:  Pt Rel to Subscriber:    Hospital Account Financial Class: Medicare    2018

## (undated) NOTE — MR AVS SNAPSHOT
[place]  [address]  [phone]               Thank you for choosing us for your health care visit with Bennie Mariscal MD.  We are glad to serve you and happy to provide you with this summary of Support Staff. Remember, [employer] is NOT to be used for urgent needs. For medical emergencies, dial 911. Visit [email or web address] to learn more.         Educational Information     Your blood pressure indicates you may be at-risk for Hypertensi active are less likely to develop some chronic diseases than adults who are inactive.      HOW TO GET STARTED: HOW TO STAY MOTIVATED:   Start activities slowly and build up over time Do what you like   Get your heart pumping – brisk walking, biking, swimmin

## (undated) NOTE — ED AVS SNAPSHOT
[facility] Emergency Department    [address]     [place] [address]    Phone:  [phone]    Fax:  [facility]   MRN: [id number]    Department:  [facility] Emergency Department   Date of Visit:  3 Where to Get Your Medications      You can get these medications from any pharmacy     Bring a paper prescription for each of these medications    - ondansetron 4 MG Tbdp            Discharge References/Attachments     DEHYDRATION (ADULT) (ENGLISH)    KEYANNA and Class Registration line at [phone] or find a doctor online by visiting [email or web address].    IF THERE IS ANY CHANGE OR WORSENING OF YOUR CONDITION, CALL YOUR PRIMARY CARE PHYSICIAN AT ONCE OR RETURN IMMEDIATELY TO [address] [place] [address].     If you to explore options for quitting.     - If you have concerns related to behavioral health issues or thoughts of harming yourself, contact [facility] at [phone].     - If you don’t have insurance, Dm Redmond

## (undated) NOTE — ED AVS SNAPSHOT
[facility] Emergency Department    [address]     [place] [address]    Phone:  [phone]    [facility]  [facility]   MRN: [id number]    Department:  [facility] Emergency Department   Date of Visit:  3 and Class Registration line at [phone] or find a doctor online by visiting [email or web address].    IF THERE IS ANY CHANGE OR WORSENING OF YOUR CONDITION, CALL YOUR PRIMARY CARE PHYSICIAN AT ONCE OR RETURN IMMEDIATELY TO [address] [place] [address].     If

## (undated) NOTE — ED AVS SNAPSHOT
Jay Bojorquez   MRN: [id number]    Department:  [facility] Emergency Department   Date of Visit:  3/20/2018           Disclosure     Insurance plans vary and the physician(s) referred by the ER may not be covered by your plan.  Please conta within the next three months to obtain basic health screening including reassessment of your blood pressure.     IF THERE IS ANY CHANGE OR WORSENING OF YOUR CONDITION, CALL YOUR PRIMARY CARE PHYSICIAN AT ONCE OR RETURN IMMEDIATELY TO THE EMERGENCY DEPARTMEN

## (undated) NOTE — IP AVS SNAPSHOT
Patient Demographics     Address  [address] [place] [address] [place] [address] Phone  [phone] [facility])  [phone] (Mobile) *Preferred* E-mail Address  [email or web address]      Emergency Contact(s)     Name Relation Home Work Mobile    Hilda Dill Divalproex Sodium 125 MG Csdr  Commonly known as:  DEPAKOTE SPRINKLE  Next dose due:  11/23 9 pm      Take 1 capsule (125 mg total) by mouth nightly.    Isabelle Rawls MD         ferrous sulfate 325 (65 FE) MG Tbec  Next dose due:  11/24 9 am      Take 1 ta Bring a paper prescription for each of these medications  Divalproex Sodium 125 MG Csdr  Heparin Sodium (Porcine) 5000 UNIT/ML Soln  HYDROcodone-acetaminophen 5-325 MG Tabs  sodium chloride 0.9 % SOLN 100 mL with tigecycline 50 MG SOLR 50 mg           536- Type Source Collected On   Blood — 11/23/18 0736          Components    Component Value Reference Range Flag Lab   Glucose 90 70 - 99 mg/dL — [facility]   Sodium 134 136 - 144 mmol/L  [facility]   Potassium 4.4 3.3 - 5.1 mmol/L [employer] [facility] - Abbreviation Name Director Address Valid Date Range    [address] [facility] Isrrael Dias. Nancy Carrizales M.D. [facility]. [address]  [facility] [address] 04/29/14 1047 - Present            Microbiology Results (All)     Procedure Component Value Units Da Author:  Sun Hoang MD Service:  Internal Medicine Author Type:  Physician    Filed:  11/17/2018  5:31 AM Date of Service:  11/16/2018  5:35 PM Status:  Signed    :  Sun Hoang MD (Physician)       [facility]    History and • KNEE REPLACEMENT SURGERY      both knees were replaced; R Knee Replacement 2010 (Osteoarthritis)   • YAG CAPSULOTOMY - OD - RIGHT EYE Right 11/25/15    RONI   • YAG CAPSULOTOMY - OS - LEFT EYE Left 11/18/15    RJBRADLEY     Family History   Problem Relation Age CREATSERUM 1.02 11/16/2018    BUN 21 (H) 11/16/2018     11/16/2018    K 4.5 11/16/2018    CL 98 11/16/2018    CO2 30 11/16/2018     (H) 11/16/2018    CA 8.7 11/16/2018    ALB 3.4 (L) 06/26/2018    ALKPHO 60 06/26/2018    BILT 0.4 06/26/2018 Reason for Consultation:  R hip wound    History of Present Illness:  Zahida Kay is a a(n) 80year old female being seen at your request regarding a complicated, infected R hip wound. Patient had been on p.o.  Antibiotics for some time and was bein • YAG CAPSULOTOMY - OS - LEFT EYE Left 11/18/15    RJM     Family History   Problem Relation Age of Onset   • Cancer Father    • Other (Other) Father    • Hypertension Mother    • Glaucoma Brother    • Cancer Brother    • Diabetes Neg    • Casey Door Hematologic/lymphatic: Negative for easy bruising, bleeding, and lymphadenopathy. Musculoskeletal: Necrotic R hip wound. Neurological: No focal neurologic deficits, seizures, tremors. Psych:  No h/o anxiety, depression, other psych d/o.    Endocrine: Radiology:  Reviewed    Assessment and Plan:    1.   POD #2 s/p deep debridement of a complicated, infected R hip wound with necrosis  - Cultures isolating both klebsiella and enterococcus  - Will begin some IV tygacil to cover both isolates given PCN all

## (undated) NOTE — IP AVS SNAPSHOT
[facility]            (For Outpatient Use Only) Initial Admit Date: 4/24/2018   Inpt/Obs Admit Date: Inpt: 4/24/18 / Obs: N/A   Discharge Date:    Saul Early:  [de-identified]   MRN: [de-identified]   CSN: [id number]        ENCOUNTER  Patient Class Subscriber Name:  Rochelle Rodriguez :    Subscriber ID:  Pt Rel to Subscriber:    Hospital Account Financial Class: Medicare    May 3, 2018

## (undated) NOTE — IP AVS SNAPSHOT
Patient Demographics     Address  [address] [place] [address] Phone  [phone] [facility])  [phone] (Mobile) *Preferred* E-mail Address  [email or web address]      Emergency Contact(s)     Name Relation Home Work Mobile    Hilda Snow 6 NEURO-PS OR  Next dose due: Tomorrow 5/4      Take 100 mg by mouth daily. Pantoprazole Sodium 40 MG Tbec  Commonly known as:  PROTONIX  Next dose due:   Today 5/3      Take 1 tablet (40 mg total) by mouth every morning before breakfast.   DESMOND SpO2  95 % Filed at 05/03/2018 1004      Patient's Most Recent Weight    Flowsheet Row Most Recent Value   Patient Weight  73 kg (161 lb)         Lab Results Last 24 Hours      BASIC METABOLIC PANEL (8) [[id number]] (Abnormal)  Resulted: 05/03/18 0731, Resu Blood Culture Result No Growth 5 Days    Occult Blood Stool Once [[id number]]  (Normal) Collected:  04/28/18 0931    Order Status:  Completed Lab Status:  Final result Updated:  04/28/18 1023    Specimen:  Stool from Stool      Occult Blood Negative    Ur Patient presents with:  Nausea/Vomiting/Diarrhea (gastrointestinal)      HPI:   Marizol Arndt is a(n) 80year old female.   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 Calcium 600 MG Oral Tab Take 600 mg by mouth.        Review of Systems:   Constitutional: Tired to this event the patient was doing well eating well with adequate and appropriate energy level for her age T4F 0.89 02/22/2018   TSH 0.10 (L) 02/22/2018   LIP 32 04/24/2018   ESRML 57 (H) 02/21/2018   MG 1.8 04/24/2018   PHOS 3.2 05/04/2017   TROP 0.01 04/24/2018       Ct Abdomen+pelvis(cpt=74176)    Result Date: 4/24/2018  CONCLUSION:  1.  Equivocal wall thicke ECG Report  Interpretation  -------------------------- NSR Low voltage -possible pulmonary disease.  ABNORMAL When compared with ECG of 02/20/2018 12:22:09 no changes Electronically signed on 04/24/2018 at 09:25 by Jono Santiago      Assessment/Plan:   [address] Assessment/Plan:  1. Sepsis - pulmonary source from aspiration  - IVF resuscitation  - pressors prn, son will let me know if she would want this in the few hours  - trend LA to clear  - abx with cefepime, flagyl and vanc for now.  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  No thrush noted. Lungs: crackles on the right   Chest wall: No tenderness or deformity. Heart: Regular rate and rhythm, normal S1S2, no murmur.    Abdomen: soft, non-tender, non-distended, positive B through 5/3/2018 10:46 AM)     No notes of this type exist for this encounter. Video Swallow Study Notes     No notes of this type exist for this encounter.          SLP Note - SLP Notes      SLP Note signed by JANE Peterson at 5/2/2018  4:1 Compensatory Strategies Recommended: No straws; Slow rate; Alternate consistencies;Small bites and sips;Multiple swallows (Feed pt to control rate and amounts of bites/sips)  Aspiration Precautions: Upright position; Slow rate;Small bites and sips; No straw  M degrees, Feed patient with moderate assistance 90 % of the time across 2 sessions.     Patient took small bites and sips. All other strategies were guided by SLP.   In Progress           FOLLOW UP  Follow Up Needed: Yes  SLP Follow-up Date: 05/03/18  Erend[place]

## (undated) NOTE — IP AVS SNAPSHOT
[facility]            (For Outpatient Use Only) Initial Admit Date: 12/31/2018   Inpt/Obs Admit Date: Inpt: 12/31/18 / Obs: N/A   Discharge Date:    Jennifer King:  [de-identified]   MRN: [de-identified]   CSN: [id number]        ENCOUNTER  Patient Cla Subscriber Name:  Carlton Calvert :    Subscriber ID:  Pt Rel to Subscriber:    Hospital Account Financial Class: Medicare    2019

## (undated) NOTE — ED AVS SNAPSHOT
Augusto Ramírez   MRN: [id number]    Department:  [facility] Emergency Department   Date of Visit:  2/20/2018           Disclosure     Insurance plans vary and the physician(s) referred by the ER may not be covered by your plan.  Please conta within the next three months to obtain basic health screening including reassessment of your blood pressure.     IF THERE IS ANY CHANGE OR WORSENING OF YOUR CONDITION, CALL YOUR PRIMARY CARE PHYSICIAN AT ONCE OR RETURN IMMEDIATELY TO THE EMERGENCY DEPARTMEN